# Patient Record
Sex: MALE | Race: WHITE | Employment: UNEMPLOYED | URBAN - METROPOLITAN AREA
[De-identification: names, ages, dates, MRNs, and addresses within clinical notes are randomized per-mention and may not be internally consistent; named-entity substitution may affect disease eponyms.]

---

## 2018-05-16 ENCOUNTER — APPOINTMENT (OUTPATIENT)
Dept: CT IMAGING | Age: 33
End: 2018-05-16
Attending: PHYSICIAN ASSISTANT
Payer: SELF-PAY

## 2018-05-16 ENCOUNTER — HOSPITAL ENCOUNTER (EMERGENCY)
Age: 33
Discharge: HOME | End: 2018-05-16
Attending: EMERGENCY MEDICINE
Payer: SELF-PAY

## 2018-05-16 VITALS
HEART RATE: 99 BPM | BODY MASS INDEX: 22.53 KG/M2 | TEMPERATURE: 97.1 F | SYSTOLIC BLOOD PRESSURE: 133 MMHG | DIASTOLIC BLOOD PRESSURE: 85 MMHG | OXYGEN SATURATION: 99 % | WEIGHT: 185 LBS | HEIGHT: 76 IN | RESPIRATION RATE: 16 BRPM

## 2018-05-16 DIAGNOSIS — S01.311A LACERATION OF RIGHT EAR LOBE, INITIAL ENCOUNTER: ICD-10-CM

## 2018-05-16 DIAGNOSIS — S01.81XA FACIAL LACERATION, INITIAL ENCOUNTER: Primary | ICD-10-CM

## 2018-05-16 PROCEDURE — 75810000293 HC SIMP/SUPERF WND  RPR

## 2018-05-16 PROCEDURE — 99282 EMERGENCY DEPT VISIT SF MDM: CPT

## 2018-05-16 PROCEDURE — 90715 TDAP VACCINE 7 YRS/> IM: CPT | Performed by: PHYSICIAN ASSISTANT

## 2018-05-16 PROCEDURE — 74011250636 HC RX REV CODE- 250/636: Performed by: PHYSICIAN ASSISTANT

## 2018-05-16 PROCEDURE — 77030031132 HC SUT NYL COVD -A

## 2018-05-16 PROCEDURE — 70450 CT HEAD/BRAIN W/O DYE: CPT

## 2018-05-16 PROCEDURE — 77030018836 HC SOL IRR NACL ICUM -A

## 2018-05-16 PROCEDURE — 70486 CT MAXILLOFACIAL W/O DYE: CPT

## 2018-05-16 PROCEDURE — 90471 IMMUNIZATION ADMIN: CPT

## 2018-05-16 PROCEDURE — 77030002986 HC SUT PROL J&J -A

## 2018-05-16 RX ORDER — LIDOCAINE HYDROCHLORIDE 10 MG/ML
INJECTION, SOLUTION EPIDURAL; INFILTRATION; INTRACAUDAL; PERINEURAL
Status: DISCONTINUED
Start: 2018-05-16 | End: 2018-05-16 | Stop reason: HOSPADM

## 2018-05-16 RX ORDER — CEPHALEXIN 500 MG/1
500 CAPSULE ORAL 3 TIMES DAILY
Qty: 30 CAP | Refills: 0 | Status: SHIPPED | OUTPATIENT
Start: 2018-05-16 | End: 2018-05-26

## 2018-05-16 RX ORDER — OXYCODONE AND ACETAMINOPHEN 5; 325 MG/1; MG/1
1 TABLET ORAL
Qty: 6 TAB | Refills: 0 | Status: SHIPPED | OUTPATIENT
Start: 2018-05-16 | End: 2019-05-01 | Stop reason: CLARIF

## 2018-05-16 RX ADMIN — TETANUS TOXOID, REDUCED DIPHTHERIA TOXOID AND ACELLULAR PERTUSSIS VACCINE, ADSORBED 0.5 ML: 5; 2.5; 8; 8; 2.5 SUSPENSION INTRAMUSCULAR at 17:08
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
## 2018-05-16 NOTE — DISCHARGE INSTRUCTIONS
Cuts Closed With Stitches: Care Instructions  Your Care Instructions  A cut can happen anywhere on your body. The doctor used stitches to close the cut. Using stitches also helps the cut heal and reduces scarring. Sometimes pieces of tape called Steri-Strips are put over the stitches. If the cut went deep and through the skin, the doctor may have put in two layers of stitches. The deeper layer brings the deep part of the cut together. These stitches will dissolve and don't need to be removed. The stitches in the upper layer are the ones you see on the cut. You will probably have a bandage over the stitches. You will need to have the stitches removed, usually in 7 to 14 days. The doctor has checked you carefully, but problems can develop later. If you notice any problems or new symptoms, get medical treatment right away. Follow-up care is a key part of your treatment and safety. Be sure to make and go to all appointments, and call your doctor if you are having problems. It's also a good idea to know your test results and keep a list of the medicines you take. How can you care for yourself at home? · Keep the cut dry for the first 24 to 48 hours. After this, you can shower if your doctor okays it. Pat the cut dry. · Don't soak the cut, such as in a bathtub. Your doctor will tell you when it's safe to get the cut wet. · If your doctor told you how to care for your cut, follow your doctor's instructions. If you did not get instructions, follow this general advice:  ¨ After the first 24 to 48 hours, wash around the cut with clean water 2 times a day. Don't use hydrogen peroxide or alcohol, which can slow healing. ¨ You may cover the cut with a thin layer of petroleum jelly, such as Vaseline, and a nonstick bandage. ¨ Apply more petroleum jelly and replace the bandage as needed. · Prop up the sore area on a pillow anytime you sit or lie down during the next 3 days.  Try to keep it above the level of your heart. This will help reduce swelling. · Avoid any activity that could cause your cut to reopen. · Do not remove the stitches on your own. Your doctor will tell you when to come back to have the stitches removed. · Leave Steri-Strips on until they fall off. · Be safe with medicines. Read and follow all instructions on the label. ¨ If the doctor gave you a prescription medicine for pain, take it as prescribed. ¨ If you are not taking a prescription pain medicine, ask your doctor if you can take an over-the-counter medicine. When should you call for help? Call your doctor now or seek immediate medical care if:  ? · You have new pain, or your pain gets worse. ? · The skin near the cut is cold or pale or changes color. ? · You have tingling, weakness, or numbness near the cut.   ? · The cut starts to bleed, and blood soaks through the bandage. Oozing small amounts of blood is normal.   ? · You have trouble moving the area near the cut.   ? · You have symptoms of infection, such as:  ¨ Increased pain, swelling, warmth, or redness around the cut. ¨ Red streaks leading from the cut. ¨ Pus draining from the cut. ¨ A fever. ? Watch closely for changes in your health, and be sure to contact your doctor if:  ? · The cut reopens. ? · You do not get better as expected. Where can you learn more? Go to http://jen-roscoe.info/. Enter R217 in the search box to learn more about \"Cuts Closed With Stitches: Care Instructions. \"  Current as of: March 20, 2017  Content Version: 11.4  © 7504-9381 [employer]. Care instructions adapted under license by [employer] (which disclaims liability or warranty for this information). If you have questions about a medical condition or this instruction, always ask your healthcare professional. [address] any warranty or liability for your use of this information.

## 2018-06-13 NOTE — COMMUNITY CARE MANAGEMENT
Called patient at number listed [phone] regarding services offered. Phone is no longer in service. Called emergency contact they stated they could not locate patient.   Will mail information regarding service

## 2019-05-01 ENCOUNTER — HOSPITAL ENCOUNTER (EMERGENCY)
Age: 34
Discharge: HOME | End: 2019-05-01
Attending: EMERGENCY MEDICINE
Payer: COMMERCIAL

## 2019-05-01 VITALS
BODY MASS INDEX: 21.92 KG/M2 | WEIGHT: 180 LBS | RESPIRATION RATE: 16 BRPM | HEIGHT: 76 IN | DIASTOLIC BLOOD PRESSURE: 70 MMHG | OXYGEN SATURATION: 94 % | HEART RATE: 96 BPM | SYSTOLIC BLOOD PRESSURE: 114 MMHG | TEMPERATURE: 97.8 F

## 2019-05-01 DIAGNOSIS — S01.81XA FACIAL LACERATION, INITIAL ENCOUNTER: Primary | ICD-10-CM

## 2019-05-01 PROCEDURE — 75810000293 HC SIMP/SUPERF WND  RPR

## 2019-05-01 PROCEDURE — 99282 EMERGENCY DEPT VISIT SF MDM: CPT

## 2019-05-01 PROCEDURE — 77030008460 HC STPLR SKN PRECIS 3M -A
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
## 2019-05-01 NOTE — DISCHARGE INSTRUCTIONS

## 2019-05-01 NOTE — ED NOTES
Discharged in the care of [facility]. Patient ambulatory from the ed in no distress
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
## 2021-06-18 ENCOUNTER — HOSPITAL ENCOUNTER (EMERGENCY)
Age: 36
Discharge: STILL ADMITTED OR ADMINISTRATIVE | End: 2021-06-18
Attending: EMERGENCY MEDICINE

## 2021-06-18 NOTE — ED PROVIDER NOTES
EMERGENCY DEPARTMENT HISTORY AND PHYSICAL EXAM  This was created with voice recognition software and transcription errors may be present. 12:08 PM  Date: (Not on file)  Patient Name: Cherry Perez    History of Presenting Illness     Chief Complaint:    History Provided By:     HPI: Cherry Perez is a 28 y.o. male presents in the lobe immediately. Patient was never seen he literally walked from the back door through the front door and out the ER    PCP: None      Past History     Past Medical History:  Past Medical History:   Diagnosis Date    Heroin addiction ([facility] [address]        Past Surgical History:  Past Surgical History:   Procedure Laterality Date    HX HERNIA REPAIR         Family History:  Family History   Family history unknown: Yes       Social History:  Social History     Tobacco Use    Smoking status: Current Every Day Smoker    Smokeless tobacco: Never Used   Substance Use Topics    Alcohol use: Yes     Alcohol/week: 10.0 standard drinks     Types: 10 Cans of beer per week     Comment: DAILY ALCOHOL INTAKE    Drug use: Yes     Frequency: 2.0 times per week     Types: Heroin       Allergies:  No Known Allergies    Review of Systems     Review of Systems  10 point review of systems otherwise negative unless noted in HPI. Physical Exam       Physical Exam    Diagnostic Study Results     Vital Signs  EKG:  Labs:   Imaging:     Medical Decision Making     ED Course: Progress Notes, Reevaluation, and Consults:    I will be the provider of record for this patient. Provider Notes (Medical Decision Making):          Diagnosis     Clinical Impression: No diagnosis found.     Disposition:        Patient's Medications    No medications on file

## 2021-07-15 ENCOUNTER — HOSPITAL ENCOUNTER (EMERGENCY)
Age: 36
Discharge: LEFT BEFORE DISCHARGE | End: 2021-07-15
Attending: STUDENT IN AN ORGANIZED HEALTH CARE EDUCATION/TRAINING PROGRAM | Admitting: STUDENT IN AN ORGANIZED HEALTH CARE EDUCATION/TRAINING PROGRAM

## 2021-07-15 VITALS
BODY MASS INDEX: 21.92 KG/M2 | SYSTOLIC BLOOD PRESSURE: 118 MMHG | HEIGHT: 76 IN | TEMPERATURE: 98.1 F | RESPIRATION RATE: 18 BRPM | WEIGHT: 180 LBS | HEART RATE: 103 BPM | DIASTOLIC BLOOD PRESSURE: 61 MMHG | OXYGEN SATURATION: 99 %

## 2021-07-15 DIAGNOSIS — T40.1X1A ACCIDENTAL OVERDOSE OF HEROIN, INITIAL ENCOUNTER (HCC): Primary | ICD-10-CM

## 2021-07-15 LAB — GLUCOSE BLD STRIP.AUTO-MCNC: 114 MG/DL (ref 70–110)

## 2021-07-15 PROCEDURE — 96360 HYDRATION IV INFUSION INIT: CPT

## 2021-07-15 PROCEDURE — 99284 EMERGENCY DEPT VISIT MOD MDM: CPT

## 2021-07-15 PROCEDURE — 74011250636 HC RX REV CODE- 250/636: Performed by: STUDENT IN AN ORGANIZED HEALTH CARE EDUCATION/TRAINING PROGRAM

## 2021-07-15 PROCEDURE — 82962 GLUCOSE BLOOD TEST: CPT

## 2021-07-15 RX ORDER — NALOXONE HYDROCHLORIDE 4 MG/.1ML
SPRAY NASAL
Qty: 2 EACH | Refills: 0 | Status: SHIPPED | OUTPATIENT
Start: 2021-07-15 | End: 2021-12-23 | Stop reason: SDUPTHER

## 2021-07-15 RX ADMIN — SODIUM CHLORIDE 1000 ML: 900 INJECTION, SOLUTION INTRAVENOUS at 19:24

## 2021-07-15 NOTE — ED TRIAGE NOTES
Patient arrived via EMS presenting with heroine overdose. Patient was given 2 Narcan via nasal and 1 Narcan in IV. Blood glucose was 175.

## 2021-07-15 NOTE — ED PROVIDER NOTES
EMERGENCY DEPARTMENT HISTORY AND PHYSICAL EXAM    7:21 PM      Date: 7/15/2021  Patient Name: Sylwia Elliott    History of Presenting Illness     Chief Complaint   Patient presents with    Drug Overdose         History Provided By: Patient, EMS     Additional History (Context): Sylwia Elliott is a 39 y.o. male with hx of heroin addiction who presents to the ED due to heroin overdose. EMS note patient was found apneic, unresponsive, 2 mg IN and 1 mg IV Narcan administered, pt became responsive, alert. Pt denies SI, HI, visual or auditory hallucinations. Admits to heroin use PTA. Denies headache, dizziness, chest pain, shortness of breath, abdominal pain, nausea or vomiting. Patient denies any concerns at this time, is requesting to leave. PCP: None    Past History     Past Medical History:  Past Medical History:   Diagnosis Date    Heroin addiction ([facility] [address]        Past Surgical History:  Past Surgical History:   Procedure Laterality Date    HX HERNIA REPAIR         Family History:  Family History   Family history unknown: Yes       Social History:  Social History     Tobacco Use    Smoking status: Current Every Day Smoker    Smokeless tobacco: Never Used   Substance Use Topics    Alcohol use: Yes     Alcohol/week: 10.0 standard drinks     Types: 10 Cans of beer per week     Comment: DAILY ALCOHOL INTAKE    Drug use: Yes     Frequency: 2.0 times per week     Types: Heroin       Allergies:  No Known Allergies      Review of Systems       Review of Systems   Constitutional: Negative for chills and fever. Respiratory: Negative for shortness of breath. Cardiovascular: Negative for chest pain. Gastrointestinal: Negative for abdominal pain, nausea and vomiting. Skin: Negative for rash. Neurological: Negative for weakness. All other systems reviewed and are negative.         Physical Exam     Visit Vitals  /61 (BP 1 Location: Right upper arm, BP Patient Position: Sitting)   Pulse (!) 103   Temp 98.1 °F (36.7 °C)   Resp 18   Ht 6' 4\" (1.93 m)   Wt 81.6 kg (180 lb)   SpO2 99%   BMI 21.91 kg/m²         Physical Exam  Vitals and nursing note reviewed. Constitutional:       General: He is not in acute distress. Appearance: Normal appearance. He is well-developed. He is not ill-appearing, toxic-appearing or diaphoretic. HENT:      Head: Normocephalic and atraumatic. Cardiovascular:      Rate and Rhythm: Normal rate and regular rhythm. Heart sounds: Normal heart sounds. No murmur heard. No friction rub. No gallop. Pulmonary:      Effort: Pulmonary effort is normal. No respiratory distress. Breath sounds: Normal breath sounds. No wheezing or rales. Abdominal:      General: Abdomen is flat. There is no distension. Tenderness: There is no abdominal tenderness. Musculoskeletal:         General: Normal range of motion. Cervical back: Normal range of motion and neck supple. Skin:     General: Skin is warm. Findings: No rash. Neurological:      General: No focal deficit present. Mental Status: He is alert and oriented to person, place, and time. Cranial Nerves: No cranial nerve deficit. Sensory: No sensory deficit. Motor: No weakness. Gait: Gait normal.           Diagnostic Study Results     Labs -  Recent Results (from the past 12 hour(s))   GLUCOSE, POC    Collection Time: 07/15/21  7:51 PM   Result Value Ref Range    Glucose (POC) 114 (H) 70 - 110 mg/dL       Radiologic Studies -   No orders to display         Medical Decision Making   I am the first provider for this patient. I reviewed the vital signs, available nursing notes, past medical history, past surgical history, family history and social history. Vital Signs-Reviewed the patient's vital signs.     Records Reviewed: Nursing Notes and Old Medical Records (Time of Review: 7:21 PM)    ED Course: Progress Notes, Reevaluation, and Consults:  8:30 PM: Pt took out his IV, ambulated out of the ED per nurse. Provider Notes (Medical Decision Making): 38 yo M who presents to the ED after heroin overdose. Narcan administered via EMS. Pt denies any concerns or complaints. Denies SI, HI. Observed in the ED x 1.5 hours, eloped prior to reassessment. Diagnosis     Clinical Impression:   1. Accidental overdose of heroin, initial encounter [facility])        Disposition: eloped     Follow-up Information     Follow up With Specialties Details Why [address] [facility]    [facility] EMERGENCY DEPT Emergency Medicine  If symptoms worsen [address] [facility] [address]  [phone]           Patient's Medications   Start Taking    NALOXONE [facility]) 4 MG/ACTUATION NASAL SPRAY    Use 1 spray intranasally, then discard. Repeat with new spray every 2 min as needed for opioid overdose symptoms, alternating nostrils. Continue Taking    No medications on file   These Medications have changed    No medications on file   Stop Taking    No medications on file       Dictation disclaimer:  Please note that this dictation was completed with Yap, the computer voice recognition software. Quite often unanticipated grammatical, syntax, homophones, and other interpretive errors are inadvertently transcribed by the computer software. Please disregard these errors. Please excuse any errors that have escaped final proofreading.

## 2021-07-15 NOTE — DISCHARGE INSTRUCTIONS
Take medication as prescribed. Follow-up with your primary care physician within 2 days for reassessment. Bring the results from this visit with you for their review.  Return to the ED immediately for any new, worsening, or persistent symptoms, including

## 2021-07-18 ENCOUNTER — HOSPITAL ENCOUNTER (EMERGENCY)
Age: 36
Discharge: LEFT BEFORE DISCHARGE | End: 2021-07-18
Attending: EMERGENCY MEDICINE

## 2021-07-18 VITALS
RESPIRATION RATE: 18 BRPM | SYSTOLIC BLOOD PRESSURE: 128 MMHG | HEART RATE: 95 BPM | OXYGEN SATURATION: 96 % | TEMPERATURE: 97.1 F | WEIGHT: 175 LBS | HEIGHT: 76 IN | DIASTOLIC BLOOD PRESSURE: 63 MMHG | BODY MASS INDEX: 21.31 KG/M2
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
## 2021-07-18 DIAGNOSIS — R45.851 SUICIDAL IDEATION: Primary | ICD-10-CM

## 2021-07-18 DIAGNOSIS — E87.0 HYPERNATREMIA: ICD-10-CM

## 2021-07-18 LAB
ALBUMIN SERPL-MCNC: 3.4 G/DL (ref 3.4–5)
ALBUMIN/GLOB SERPL: 0.9 {RATIO} (ref 0.8–1.7)
ALP SERPL-CCNC: 88 U/L (ref 45–117)
ALT SERPL-CCNC: 42 U/L (ref 16–61)
ANION GAP SERPL CALC-SCNC: 6 MMOL/L (ref 3–18)
AST SERPL-CCNC: 26 U/L (ref 10–38)
BASOPHILS # BLD: 0 K/UL (ref 0–0.1)
BASOPHILS NFR BLD: 1 % (ref 0–2)
BILIRUB SERPL-MCNC: 0.7 MG/DL (ref 0.2–1)
BUN SERPL-MCNC: 7 MG/DL (ref 7–18)
BUN/CREAT SERPL: 8 (ref 12–20)
CALCIUM SERPL-MCNC: 8 MG/DL (ref 8.5–10.1)
CHLORIDE SERPL-SCNC: 116 MMOL/L (ref 100–111)
CO2 SERPL-SCNC: 27 MMOL/L (ref 21–32)
CREAT SERPL-MCNC: 0.89 MG/DL (ref 0.6–1.3)
DIFFERENTIAL METHOD BLD: NORMAL
EOSINOPHIL # BLD: 0.2 K/UL (ref 0–0.4)
EOSINOPHIL NFR BLD: 3 % (ref 0–5)
ERYTHROCYTE [DISTWIDTH] IN BLOOD BY AUTOMATED COUNT: 11.7 % (ref 11.6–14.5)
ETHANOL SERPL-MCNC: 239 MG/DL (ref 0–3)
GLOBULIN SER CALC-MCNC: 3.6 G/DL (ref 2–4)
GLUCOSE SERPL-MCNC: 107 MG/DL (ref 74–99)
HCT VFR BLD AUTO: 42.5 % (ref 36–48)
HGB BLD-MCNC: 14.7 G/DL (ref 13–16)
LYMPHOCYTES # BLD: 2.4 K/UL (ref 0.9–3.6)
LYMPHOCYTES NFR BLD: 33 % (ref 21–52)
MAGNESIUM SERPL-MCNC: 2.1 MG/DL (ref 1.6–2.6)
MCH RBC QN AUTO: 31.1 PG (ref 24–34)
MCHC RBC AUTO-ENTMCNC: 34.6 G/DL (ref 31–37)
MCV RBC AUTO: 89.9 FL (ref 74–97)
MONOCYTES # BLD: 0.7 K/UL (ref 0.05–1.2)
MONOCYTES NFR BLD: 10 % (ref 3–10)
NEUTS SEG # BLD: 4 K/UL (ref 1.8–8)
NEUTS SEG NFR BLD: 54 % (ref 40–73)
PLATELET # BLD AUTO: 319 K/UL (ref 135–420)
PMV BLD AUTO: 9.3 FL (ref 9.2–11.8)
POTASSIUM SERPL-SCNC: 3.4 MMOL/L (ref 3.5–5.5)
PROT SERPL-MCNC: 7 G/DL (ref 6.4–8.2)
RBC # BLD AUTO: 4.73 M/UL (ref 4.35–5.65)
SODIUM SERPL-SCNC: 149 MMOL/L (ref 136–145)
WBC # BLD AUTO: 7.4 K/UL (ref 4.6–13.2)

## 2021-07-18 PROCEDURE — 82077 ASSAY SPEC XCP UR&BREATH IA: CPT

## 2021-07-18 PROCEDURE — 74011250637 HC RX REV CODE- 250/637: Performed by: STUDENT IN AN ORGANIZED HEALTH CARE EDUCATION/TRAINING PROGRAM

## 2021-07-18 PROCEDURE — 80053 COMPREHEN METABOLIC PANEL: CPT

## 2021-07-18 PROCEDURE — 74011250636 HC RX REV CODE- 250/636: Performed by: STUDENT IN AN ORGANIZED HEALTH CARE EDUCATION/TRAINING PROGRAM

## 2021-07-18 PROCEDURE — 99283 EMERGENCY DEPT VISIT LOW MDM: CPT

## 2021-07-18 PROCEDURE — 83735 ASSAY OF MAGNESIUM: CPT

## 2021-07-18 PROCEDURE — 96360 HYDRATION IV INFUSION INIT: CPT

## 2021-07-18 PROCEDURE — 96361 HYDRATE IV INFUSION ADD-ON: CPT

## 2021-07-18 PROCEDURE — 85025 COMPLETE CBC W/AUTO DIFF WBC: CPT

## 2021-07-18 RX ORDER — IBUPROFEN 200 MG
1 TABLET ORAL
Status: DISCONTINUED | OUTPATIENT
Start: 2021-07-18 | End: 2021-07-18 | Stop reason: HOSPADM

## 2021-07-18 RX ORDER — IBUPROFEN 200 MG
1 TABLET ORAL DAILY
Status: DISCONTINUED | OUTPATIENT
Start: 2021-07-19 | End: 2021-07-18

## 2021-07-18 RX ADMIN — SODIUM CHLORIDE 1000 ML: 900 INJECTION, SOLUTION INTRAVENOUS at 15:00

## 2021-07-18 NOTE — ED NOTES
Per patient request, called father who is listed as emergency contact. Informed him of patients whereabouts and what is going on with him.

## 2021-07-18 NOTE — ED NOTES
[facility]. He told Jean-Paul Whitehead that we were going to call the police anyway. So he left with IV in place.

## 2021-07-18 NOTE — ED PROVIDER NOTES
EMERGENCY DEPARTMENT HISTORY AND PHYSICAL EXAM    1:37 PM      Date: 7/18/2021  Patient Name: Rodrigue Hilario    History of Presenting Illness     Chief Complaint   Patient presents with    Alcohol intoxication         History Provided By: Patient and EMS  Location/Duration/Severity/Modifying factors   The history is provided by the patient, the EMS personnel and medical records. Alcohol intoxication  Primary symptoms include: agitation, self-injury and intoxication. There areno confusion present at this time. This is a recurrent problem. The current episode started more than 2 days ago. The problem has not changed since onset. Suspected agents include alcohol and heroin. Pertinent negatives include no fever, no nausea and no vomiting. Associated medical issues include suicidal ideas and depression. PCP: None    Current Facility-Administered Medications   Medication Dose Route Frequency Provider Last Rate Last Admin    lactated ringers bolus infusion 1,000 mL  1,000 mL IntraVENous ONCE Belle BO MD        nicotine (NICODERM CQ) 14 mg/24 hr patch 1 Patch  1 Patch TransDERmal NOW Chris Stoddard MD   1 Patch at 07/18/21 4268     Current Outpatient Medications   Medication Sig Dispense Refill    naloxone (Narcan) 4 mg/actuation nasal spray Use 1 spray intranasally, then discard. Repeat with new spray every 2 min as needed for opioid overdose symptoms, alternating nostrils. 2 Each 0       Past History     Past Medical History:  Past Medical History:   Diagnosis Date    Heroin addiction ([address]        Past Surgical History:  Past Surgical History:   Procedure Laterality Date    HX HERNIA REPAIR         Family History:  Family History   Family history unknown: Yes       Social History:  Social History     Tobacco Use    Smoking status: Current Every Day Smoker    Smokeless tobacco: Never Used   Substance Use Topics    Alcohol use:  Yes     Alcohol/week: 10.0 standard drinks     Types: 10 Cans of beer per week     Comment: DAILY ALCOHOL INTAKE    Drug use: Yes     Frequency: 2.0 times per week     Types: Heroin       Allergies:  No Known Allergies      Review of Systems       Review of Systems   Constitutional: Negative for chills, diaphoresis and fever. HENT: Negative for sore throat, trouble swallowing and voice change. Eyes: Negative for photophobia and visual disturbance. Respiratory: Negative for cough and shortness of breath. Cardiovascular: Negative for chest pain and palpitations. Gastrointestinal: Negative for abdominal pain, blood in stool, diarrhea, nausea and vomiting. Genitourinary: Negative for dysuria, frequency and hematuria. Musculoskeletal: Negative for joint swelling and myalgias. Skin: Negative for color change and rash. Neurological: Negative for dizziness and syncope. Hematological: Negative for adenopathy. Does not bruise/bleed easily. Psychiatric/Behavioral: Positive for agitation, depression, self-injury and suicidal ideas. Negative for confusion and decreased concentration. Physical Exam     Visit Vitals  /63 (BP 1 Location: Right upper arm, BP Patient Position: At rest;Supine)   Pulse 95   Temp 97.1 °F (36.2 °C)   Resp 18   Ht 6' 4\" (1.93 m)   Wt 79.4 kg (175 lb)   SpO2 96%   BMI 21.30 kg/m²         Physical Exam  Vitals and nursing note reviewed. Constitutional:       General: He is not in acute distress. Appearance: He is well-developed. He is not toxic-appearing. HENT:      Head: Normocephalic and atraumatic. Mouth/Throat:      Mouth: Mucous membranes are dry. Pharynx: Oropharynx is clear. Eyes:      General: No scleral icterus. Extraocular Movements: Extraocular movements intact. Right eye: Normal extraocular motion and no nystagmus. Left eye: Normal extraocular motion and no nystagmus. Pupils: Pupils are equal, round, and reactive to light. Neck:      Meningeal: Kernig's sign absent. Cardiovascular:      Rate and Rhythm: Normal rate and regular rhythm. Pulses: Normal pulses. Pulmonary:      Effort: Pulmonary effort is normal.   Musculoskeletal:         General: No swelling or tenderness. Cervical back: Normal range of motion. No rigidity. Skin:     General: Skin is warm and dry. Coloration: Skin is not cyanotic or pale. Findings: No rash. Neurological:      Mental Status: He is alert. Cranial Nerves: No cranial nerve deficit, dysarthria or facial asymmetry. Sensory: No sensory deficit. Motor: No weakness. Psychiatric:         Attention and Perception: Attention and perception normal.         Mood and Affect: Mood is anxious and depressed. Affect is labile. Speech: Speech is slurred. Behavior: Behavior is agitated and withdrawn. Thought Content: Thought content includes suicidal ideation. Thought content includes suicidal plan. Cognition and Memory: Memory normal.         Judgment: Judgment is inappropriate. Diagnostic Study Results     Labs -  Recent Results (from the past 12 hour(s))   CBC WITH AUTOMATED DIFF    Collection Time: 07/18/21  3:37 PM   Result Value Ref Range    WBC 7.4 4.6 - 13.2 K/uL    RBC 4.73 4.35 - 5.65 M/uL    HGB 14.7 13.0 - 16.0 g/dL    HCT 42.5 36.0 - 48.0 %    MCV 89.9 74.0 - 97.0 FL    MCH 31.1 24.0 - 34.0 PG    MCHC 34.6 31.0 - 37.0 g/dL    RDW 11.7 11.6 - 14.5 %    PLATELET 998 094 - 874 K/uL    MPV 9.3 9.2 - 11.8 FL    NEUTROPHILS 54 40 - 73 %    LYMPHOCYTES 33 21 - 52 %    MONOCYTES 10 3 - 10 %    EOSINOPHILS 3 0 - 5 %    BASOPHILS 1 0 - 2 %    ABS. NEUTROPHILS 4.0 1.8 - 8.0 K/UL    ABS. LYMPHOCYTES 2.4 0.9 - 3.6 K/UL    ABS. MONOCYTES 0.7 0.05 - 1.2 K/UL    ABS. EOSINOPHILS 0.2 0.0 - 0.4 K/UL    ABS.  BASOPHILS 0.0 0.0 - 0.1 K/UL    DF AUTOMATED     METABOLIC PANEL, COMPREHENSIVE    Collection Time: 07/18/21  3:37 PM   Result Value Ref Range    Sodium 149 (H) 136 - 145 mmol/L Potassium 3.4 (L) 3.5 - 5.5 mmol/L    Chloride 116 (H) 100 - 111 mmol/L    CO2 27 21 - 32 mmol/L    Anion gap 6 3.0 - 18 mmol/L    Glucose 107 (H) 74 - 99 mg/dL    BUN 7 7.0 - 18 MG/DL    Creatinine 0.89 0.6 - 1.3 MG/DL    BUN/Creatinine ratio 8 (L) 12 - 20      GFR est AA >60 >60 ml/min/1.73m2    GFR est non-AA >60 >60 ml/min/1.73m2    Calcium 8.0 (L) 8.5 - 10.1 MG/DL    Bilirubin, total 0.7 0.2 - 1.0 MG/DL    ALT (SGPT) 42 16 - 61 U/L    AST (SGOT) 26 10 - 38 U/L    Alk. phosphatase 88 45 - 117 U/L    Protein, total 7.0 6.4 - 8.2 g/dL    Albumin 3.4 3.4 - 5.0 g/dL    Globulin 3.6 2.0 - 4.0 g/dL    A-G Ratio 0.9 0.8 - 1.7     MAGNESIUM    Collection Time: 07/18/21  3:37 PM   Result Value Ref Range    Magnesium 2.1 1.6 - 2.6 mg/dL   ETHYL ALCOHOL    Collection Time: 07/18/21  3:37 PM   Result Value Ref Range    ALCOHOL(ETHYL),SERUM 239 (H) 0 - 3 MG/DL       Radiologic Studies -   No orders to display         Medical Decision Making   I am the first provider for this patient. I reviewed the vital signs, available nursing notes, past medical history, past surgical history, family history and social history. Vital Signs-Reviewed the patient's vital signs. EKG: N/A    Records Reviewed: Nursing Notes, Old Medical Records, Previous Radiology Studies and Previous Laboratory Studies (Time of Review: 1:37 PM)    ED Course: Progress Notes, Reevaluation, and Consults:    ED Course as of Jul 18 1837   Sun Jul 18, 2021   157 61-year-old male history of tobacco abuse, several heroin overdoses, and alcohol abuse brought in by EMS for reported severe intoxication and being outside for several days per bystanders were on scene. Patient was recently seen at this ER a few days earlier for accidental heroin overdose but eloped from the department prior to completing his evaluation.   On my exam patient tearful and minimally cooperative with exam but is alert and oriented person place and time with no focal neurological deficits on exam.  Heavy smell of alcohol as well as patient appears clinically intoxicated on my exam.  Patient endorsed to me during this time that his recent evaluations for accidental heroin overdose were actually suicide attempts and that he had been suicidal for quite some time now. Initially, patient does consent for crisis evaluation once he is clinically sober. He is hemodynamically stable and physical exam without evidence of an organic medical cause for his symptoms. Initial differential considered to include but not limited to hypo/hyperthyroidism, porphyria, encephalitis, hypo/hyperglycemia, metabolic encephalopathy, parathyroid disease, acute intoxication, toxidrome, acute psychosis, and tasha. Laboratory evaluation started to exclude infectious etiology, hypo/hyperglycemia, metabolic encephalopathy, or parathyroid disease. There is no evidence to suggest acute psychosis or tasha at this time. Will obtain UDS and screening coronavirus testing as per psych admission protocol. [CM]   9954 Patient eloped from the emergency department with IVs still attached. Given patient with active suicidality with plan and multiple recent attempts patient does not have medical decision making to be able to refuse care and as such will call police in order to find the patient and plan for hopeful medical ECO. [CM]      ED Course User Index  [CM] Mirta Nieves MD       Provider Notes (Medical Decision Making): MDM    Procedures    Critical Care Time: N/A      Diagnosis     Clinical Impression:   1. Suicidal ideation    2. Hypernatremia    3. Blood alcohol level of 200-239 mg/100 ml        Disposition: Eloped    Follow-up Information    None          Discharge Medication List as of 7/18/2021  6:09 PM      CONTINUE these medications which have NOT CHANGED    Details   naloxone (Narcan) 4 mg/actuation nasal spray Use 1 spray intranasally, then discard.  Repeat with new spray every 2 min as needed for opioid overdose symptoms, alternating nostrils. , Normal, Disp-2 Each, R-0           Disclaimer: Sections of this note are dictated using utilizing voice recognition software. Minor typographical errors may be present. If questions arise, please do not hesitate to contact me or call our department.

## 2021-07-18 NOTE — ED TRIAGE NOTES
Per EMS, Patient is intoxicated. Someone found him behind the [place], He has been outside for a few days, admits to copious amounts of ETOH.

## 2021-10-02 ENCOUNTER — HOSPITAL ENCOUNTER (EMERGENCY)
Age: 36
Discharge: LEFT BEFORE DISCHARGE | End: 2021-10-02
Attending: STUDENT IN AN ORGANIZED HEALTH CARE EDUCATION/TRAINING PROGRAM

## 2021-10-02 VITALS
TEMPERATURE: 98 F | DIASTOLIC BLOOD PRESSURE: 74 MMHG | RESPIRATION RATE: 16 BRPM | OXYGEN SATURATION: 95 % | HEART RATE: 91 BPM | SYSTOLIC BLOOD PRESSURE: 118 MMHG

## 2021-10-02 DIAGNOSIS — F10.920 ALCOHOLIC INTOXICATION WITHOUT COMPLICATION (HCC): Primary | ICD-10-CM

## 2021-10-02 PROCEDURE — 99282 EMERGENCY DEPT VISIT SF MDM: CPT

## 2021-10-02 PROCEDURE — 99281 EMR DPT VST MAYX REQ PHY/QHP: CPT

## 2021-10-02 NOTE — ED TRIAGE NOTES
Pt arrived via medic with [place] police after overdosing on 2 caps of fentanyl.  Pt awake, alert, oriented and talkative on arrival.

## 2021-10-02 NOTE — ED PROVIDER NOTES
HPI   Patient is a 26-year-old male who presents after being brought in by police after he was found wandering around. Patient does admit to drug and alcohol use including heroin. He states that he snorts and does not inject. He denies any other drugs at this time. He has any physical pain or discomfort. His main complaint is that he would like water. He denies any chest pain, shortness breath, difficulty breathing, fevers, sweats, chills. He has no other physical bites at this time. Past Medical History:   Diagnosis Date    Heroin addiction [facility])        Past Surgical History:   Procedure Laterality Date    HX HERNIA REPAIR           Family History:   Family history unknown: Yes       Social History     Socioeconomic History    Marital status: SINGLE     Spouse name: Not on file    Number of children: Not on file    Years of education: Not on file    Highest education level: Not on file   Occupational History    Not on file   Tobacco Use    Smoking status: Current Every Day Smoker    Smokeless tobacco: Never Used   Substance and Sexual Activity    Alcohol use: Yes     Alcohol/week: 10.0 standard drinks     Types: 10 Cans of beer per week     Comment: DAILY ALCOHOL INTAKE    Drug use: Yes     Frequency: 2.0 times per week     Types: Heroin    Sexual activity: Yes     Partners: Female     Birth control/protection: None   Other Topics Concern    Not on file   Social History Narrative    Not on file     Social Determinants of Health     Financial Resource Strain:     Difficulty of Paying Living Expenses:    Food Insecurity:     Worried About Running Out of Food in the Last Year:     Ran Out of Food in the Last Year:    Transportation Needs:     Lack of Transportation (Medical):      Lack of Transportation (Non-Medical):    Physical Activity:     Days of Exercise per Week:     Minutes of Exercise per Session:    Stress:     Feeling of Stress :    Social Connections:     Frequency of Communication with Friends and Family:     Frequency of Social Gatherings with Friends and Family:     Attends Orthodoxy Services:     Active Member of Clubs or Organizations:     Attends Club or Organization Meetings:     Marital Status:    Intimate Partner Violence:     Fear of Current or Ex-Partner:     Emotionally Abused:     Physically Abused:     Sexually Abused: ALLERGIES: Patient has no known allergies. Review of Systems  Constitutional: No fever  HENT: No ear pain  Eyes: No change in vision  Respiratory: No SOB  Cardio: No chest pain  GI: No blood in stool  : No hematuria  MSK: No back pain  Skin: No rashes  Neuro: No headache    Vitals:    10/02/21 0441   BP: 118/74   Pulse: 91   Resp: 16   Temp: 98 °F (36.7 °C)   SpO2: 95%            Physical Exam   General: No acute distress  Head: Normocephalic, atraumatic  Psych: Cooperative and alert  Eyes: Pupils 2 mm, equal round and reactive  ENT: Moist oral mucosa  Neck: Supple  CV: Regular rate and rhythm, no pitting edema, palpable radial pulses  Pulm: Clear breath sounds bilaterally without any wheezing or rhonchi, normal respiratory rate  GI: Normal bowel sounds, soft, non-tender  MSK: Moves all four extremities  Skin: No rashes  Neuro: Alert and conversive    MDM   Patient is a 68-year-old male who presents with acute intoxication. Patient is not showing any specific toxidromes. He has no concerning findings of trauma or injury and no physical complaints. His physical exam is otherwise unremarkable and he is hemodynamically stable. Patient will be monitored and clinically sober. I did want to obtain a CBG but patient refuses at this time. Is no history of diabetes he will be given some food and water. Patient was signed out to [facility] pending clinical sobriety. He continues to be hemodynamically stable.     Procedures

## 2021-10-02 NOTE — ED NOTES
[address] HANDOFF      Patient was turned over to me at the regular change of shift by Dr. Jyoti Roth, at 3429. Patient presented with evidently heroin overdose combined with alcohol and other substances. Plan for this patient is: Awaiting for sobriety for discharge planning. No results found for this or any previous visit (from the past 12 hour(s)). No orders to display       Medications - No data to display      Course:   ED Course as of Oct 02 1548   Sat Oct 02, 2021   0900 Patient ambulated to the bathroom advised the patient that I would be in to talk to him shortly to his hallway bed. He was at that time, ambulated with a steady gait and seemed to be speaking coherently. He eloped from the emergency department shortly thereafter. [BERNADINE]      ED Course User Index  [BERNADINE] Hayes Bettencourt DO       Diagnosis:   1. Alcoholic intoxication without complication ([facility] [address]          Disposition: Eloped    Follow-up Information       Follow up With Specialties Details Why [address] [place] [address]  In 1 week  [address] [facility]  [phone]            Patient's Medications   Start Taking    No medications on file   Continue Taking    NALOXONE (NARCAN) 4 MG/ACTUATION NASAL SPRAY    Use 1 spray intranasally, then discard. Repeat with new spray every 2 min as needed for opioid overdose symptoms, alternating nostrils.    These Medications have changed    No medications on file   Stop Taking    No medications on file

## 2021-12-09 ENCOUNTER — HOSPITAL ENCOUNTER (EMERGENCY)
Age: 36
Discharge: LEFT BEFORE DISCHARGE | End: 2021-12-09
Attending: EMERGENCY MEDICINE | Admitting: EMERGENCY MEDICINE

## 2021-12-09 VITALS
OXYGEN SATURATION: 96 % | TEMPERATURE: 97.9 F | HEART RATE: 90 BPM | DIASTOLIC BLOOD PRESSURE: 80 MMHG | RESPIRATION RATE: 16 BRPM | SYSTOLIC BLOOD PRESSURE: 139 MMHG

## 2021-12-09 DIAGNOSIS — F19.90 DRUG USE DISORDER: Primary | ICD-10-CM

## 2021-12-09 PROCEDURE — 99282 EMERGENCY DEPT VISIT SF MDM: CPT

## 2021-12-09 RX ORDER — DIAZEPAM 5 MG/1
10 TABLET ORAL
Status: DISCONTINUED | OUTPATIENT
Start: 2021-12-09 | End: 2021-12-09 | Stop reason: HOSPADM

## 2021-12-09 NOTE — ED TRIAGE NOTES
Patient BIBA for involuntary tremors. Patient was found on the side of the road, picked by CARMELINA Henderson.

## 2021-12-09 NOTE — ED PROVIDER NOTES
EMERGENCY DEPARTMENT HISTORY AND PHYSICAL EXAM    3:27 PM patient seen at this time on EMS stretcher in [facility] to expedite care      Date: 12/9/2021  Patient Name: Shanel Bush    History of Presenting Illness     Chief Complaint   Patient presents with    Other         History Provided By: Paramedic    Additional History (Context): Shanel Bush is a 39 y.o. male presents with passerby called 911 patient seated on the curb while gesticulations. Police paramedics contacted the patient he acknowledges using 1 cap of fentanyl, denies coingestants. Denies any further medical problems or complaints. Says he will not wear a mask, because he cannot breathe. History review of systems limited by patient's cooperation. PCP: None    Chief Complaint:   Duration:    Timing:    Location:   Quality:   Severity:   Modifying Factors:   Associated Symptoms:       Current Facility-Administered Medications   Medication Dose Route Frequency Provider Last Rate Last Admin    diazePAM (VALIUM) tablet 10 mg  10 mg Oral NOW Dick Storm MD         Current Outpatient Medications   Medication Sig Dispense Refill    naloxone (Narcan) 4 mg/actuation nasal spray Use 1 spray intranasally, then discard. Repeat with new spray every 2 min as needed for opioid overdose symptoms, alternating nostrils. 2 Each 0       Past History     Past Medical History:  Past Medical History:   Diagnosis Date    Heroin addiction ([facility] [address]        Past Surgical History:  Past Surgical History:   Procedure Laterality Date    HX HERNIA REPAIR         Family History:  Family History   Family history unknown: Yes       Social History:  Social History     Tobacco Use    Smoking status: Current Every Day Smoker    Smokeless tobacco: Never Used   Substance Use Topics    Alcohol use:  Yes     Alcohol/week: 10.0 standard drinks     Types: 10 Cans of beer per week     Comment: DAILY ALCOHOL INTAKE    Drug use: Yes     Frequency: 2.0 times per week Types: Heroin       Allergies:  No Known Allergies      Review of Systems     Review of Systems   Cardiovascular: Negative for chest pain. Gastrointestinal: Negative for abdominal pain, diarrhea and vomiting. Physical Exam       Patient Vitals for the past 12 hrs:   Temp Pulse Resp BP SpO2   12/09/21 1532 97.9 °F (36.6 °C) 90 16 139/80 96 %       IPVITALS  Patient Vitals for the past 24 hrs:   BP Temp Pulse Resp SpO2   12/09/21 1532 139/80 97.9 °F (36.6 °C) 90 16 96 %       Physical Exam  Vitals and nursing note reviewed. Constitutional:       General: He is not in acute distress. Appearance: He is well-developed. HENT:      Head: Normocephalic and atraumatic. Eyes:      General: No scleral icterus. Extraocular Movements: Extraocular movements intact. Conjunctiva/sclera: Conjunctivae normal.      Pupils: Pupils are equal, round, and reactive to light. Neck:      Vascular: No JVD. Cardiovascular:      Rate and Rhythm: Normal rate and regular rhythm. Pulmonary:      Effort: Pulmonary effort is normal. No respiratory distress. Musculoskeletal:         General: Normal range of motion. Cervical back: Normal range of motion and neck supple. Skin:     General: Skin is warm and dry. Neurological:      General: No focal deficit present. Mental Status: He is alert. Cranial Nerves: No cranial nerve deficit. Comments: Rapid flailing gesticulations. Suspect stimulant intoxication. Not seizures not organic myoclonus   Psychiatric:         Thought Content: Thought content normal.         Judgment: Judgment normal.           Diagnostic Study Results   Labs -  No results found for this or any previous visit (from the past 12 hour(s)). Radiologic Studies -   No orders to display     No results found.     Medications ordered:   Medications   diazePAM (VALIUM) tablet 10 mg (has no administration in time range)         Medical Decision Making   Initial Medical Decision Making and DDx:  Suspect stimulant intoxication clinical picture not consistent with stroke intracranial hemorrhage electrolyte abnormality muscle spasms myoclonus or seizure. Benzos ordered    ED Course: Progress Notes, Reevaluation, and Consults:     4:03 PM notified by security the patient was preparing to leave he is getting dressed problems armband off. His behavior is not aggressive though his gesticulations continue. He has not received his Valium. I did offer him his Valium as well as other comfort measures he declines says thank you puts on his clothing and Aleve and leaves the ER. He did not voice any suicidal or homicidal ideation there is no indication of psychosis, have no cause to hold him against as well. I am the first provider for this patient. I reviewed the vital signs, available nursing notes, past medical history, past surgical history, family history and social history. Patient Vitals for the past 12 hrs:   Temp Pulse Resp BP SpO2   12/09/21 1532 97.9 °F (36.6 °C) 90 16 139/80 96 %       Vital Signs-Reviewed the patient's vital signs. Pulse Oximetry Analysis, Cardiac Monitor, 12 lead ekg:      Interpreted by the EP. Records Reviewed: Nursing notes reviewed (Time of Review: 3:27 PM)    Procedures:   Critical Care Time:   Aspirin: (was aspirin given for stroke?)    Diagnosis     Clinical Impression:   1. Drug use disorder        Disposition: Eloped      Follow-up Information    None          Patient's Medications   Start Taking    No medications on file   Continue Taking    NALOXONE (NARCAN) 4 MG/ACTUATION NASAL SPRAY    Use 1 spray intranasally, then discard. Repeat with new spray every 2 min as needed for opioid overdose symptoms, alternating nostrils.    These Medications have changed    No medications on file   Stop Taking    No medications on file     _______________________________    Notes:    Vonzella Habermann, MD using Dragon dictation      _______________________________

## 2021-12-23 ENCOUNTER — HOSPITAL ENCOUNTER (EMERGENCY)
Age: 36
Discharge: HOME | End: 2021-12-24
Attending: STUDENT IN AN ORGANIZED HEALTH CARE EDUCATION/TRAINING PROGRAM

## 2021-12-23 ENCOUNTER — HOSPITAL ENCOUNTER (EMERGENCY)
Age: 36
Discharge: HOME | End: 2021-12-23
Attending: EMERGENCY MEDICINE

## 2021-12-23 VITALS
OXYGEN SATURATION: 96 % | DIASTOLIC BLOOD PRESSURE: 78 MMHG | TEMPERATURE: 98.4 F | SYSTOLIC BLOOD PRESSURE: 122 MMHG | RESPIRATION RATE: 22 BRPM | HEART RATE: 89 BPM

## 2021-12-23 VITALS
TEMPERATURE: 98.2 F | RESPIRATION RATE: 20 BRPM | HEIGHT: 73 IN | WEIGHT: 165 LBS | BODY MASS INDEX: 21.87 KG/M2 | DIASTOLIC BLOOD PRESSURE: 69 MMHG | SYSTOLIC BLOOD PRESSURE: 131 MMHG | OXYGEN SATURATION: 100 % | HEART RATE: 86 BPM

## 2021-12-23 DIAGNOSIS — F10.920 ALCOHOLIC INTOXICATION WITHOUT COMPLICATION (HCC): Primary | ICD-10-CM

## 2021-12-23 DIAGNOSIS — F11.10 HEROIN ABUSE (HCC): Primary | ICD-10-CM

## 2021-12-23 PROCEDURE — 99283 EMERGENCY DEPT VISIT LOW MDM: CPT

## 2021-12-23 PROCEDURE — 99281 EMR DPT VST MAYX REQ PHY/QHP: CPT

## 2021-12-23 RX ORDER — NALOXONE HYDROCHLORIDE 4 MG/.1ML
SPRAY NASAL
Qty: 2 EACH | Refills: 0 | Status: SHIPPED | OUTPATIENT
Start: 2021-12-23

## 2021-12-23 NOTE — ED PROVIDER NOTES
EMERGENCY DEPARTMENT HISTORY AND PHYSICAL EXAM    2:55 PM      Date: 12/23/2021  Patient Name: Tiana Cantu    History of Presenting Illness     Chief Complaint   Patient presents with    Physical         History Provided By: Patient and EMS  Location/Duration/Severity/Modifying factors   The patient is a 14-year-old male with a history of polysubstance abuse the presents emergency department after being found dancing and hitting himself in the street per EMS reports. In the emergency department the patient has no complaints and says he admits to using heroin and used too much. The patient says that he is not homeless and lives with somebody and has had this happen before. Patient does not want to stay nor does he want to go into treatment at this time. Patient admits to smoking cigarettes, drinking alcohol periodically, admits to heroin abuse. Patient notes he is too much heroin today and that is why he is acting differently than normal.  The patient denies any other aggravating or alleviating factors. PCP: None    Current Outpatient Medications   Medication Sig Dispense Refill    naloxone (Narcan) 4 mg/actuation nasal spray Use 1 spray intranasally, then discard. Repeat with new spray every 2 min as needed for opioid overdose symptoms, alternating nostrils. 2 Each 0       Past History     Past Medical History:  Past Medical History:   Diagnosis Date    Heroin addiction ([facility] [address]        Past Surgical History:  Past Surgical History:   Procedure Laterality Date    HX HERNIA REPAIR         Family History:  Family History   Family history unknown: Yes       Social History:  Social History     Tobacco Use    Smoking status: Current Every Day Smoker    Smokeless tobacco: Never Used   Substance Use Topics    Alcohol use:  Yes     Alcohol/week: 10.0 standard drinks     Types: 10 Cans of beer per week     Comment: DAILY ALCOHOL INTAKE    Drug use: Yes     Frequency: 2.0 times per week     Types: Heroin Allergies:  No Known Allergies      Review of Systems       Review of Systems   Constitutional: Negative for activity change, fatigue and fever. HENT: Negative for congestion and rhinorrhea. Eyes: Negative for visual disturbance. Respiratory: Negative for shortness of breath. Cardiovascular: Negative for chest pain and palpitations. Gastrointestinal: Negative for abdominal pain, diarrhea, nausea and vomiting. Genitourinary: Negative for dysuria and hematuria. Musculoskeletal: Negative for back pain. Skin: Negative for rash. Neurological: Negative for dizziness, weakness and light-headedness. Psychiatric/Behavioral: Positive for behavioral problems. Negative for agitation and suicidal ideas. The patient is hyperactive. All other systems reviewed and are negative. Physical Exam     Visit Vitals  /69 (BP 1 Location: Left upper arm, BP Patient Position: At rest)   Pulse 86   Temp 98.2 °F (36.8 °C)   Resp 20   Ht 6' 1\" (1.854 m)   Wt 74.8 kg (165 lb)   SpO2 100%   BMI 21.77 kg/m²         Physical Exam  Vitals and nursing note reviewed. Constitutional:       General: He is not in acute distress. Appearance: He is well-developed. Comments: Poor hygiene, dancing, intermittently yelling but easily redirected   HENT:      Head: Normocephalic and atraumatic. Right Ear: External ear normal.      Left Ear: External ear normal.      Nose: Nose normal.   Eyes:      General: No scleral icterus. Conjunctiva/sclera: Conjunctivae normal.      Pupils: Pupils are equal, round, and reactive to light. Neck:      Thyroid: No thyromegaly. Vascular: No JVD. Trachea: No tracheal deviation. Cardiovascular:      Rate and Rhythm: Normal rate and regular rhythm. Heart sounds: Normal heart sounds. No murmur heard. No friction rub. No gallop. Pulmonary:      Effort: Pulmonary effort is normal.      Breath sounds: Normal breath sounds.    Chest:      Chest wall: No tenderness. Abdominal:      General: Bowel sounds are normal. There is no distension. Palpations: Abdomen is soft. Tenderness: There is no abdominal tenderness. There is no guarding or rebound. Musculoskeletal:         General: No tenderness. Normal range of motion. Cervical back: Normal range of motion and neck supple. Lymphadenopathy:      Cervical: No cervical adenopathy. Skin:     General: Skin is warm and dry. Neurological:      Mental Status: He is alert and oriented to person, place, and time. Cranial Nerves: No cranial nerve deficit. Coordination: Coordination normal.      Comments: No sensory loss, Gait normal, Motor 5/5   Psychiatric:      Comments: No SI or HI           Diagnostic Study Results     Labs -  No results found for this or any previous visit (from the past 12 hour(s)). Radiologic Studies -   No orders to display         Medical Decision Making   I am the first provider for this patient. I reviewed the vital signs, available nursing notes, past medical history, past surgical history, family history and social history. Vital Signs-Reviewed the patient's vital signs. Records Reviewed: Nursing Notes, Old Medical Records, Previous Radiology Studies and Previous Laboratory Studies (Time of Review: 2:55 PM)    ED Course: Progress Notes, Reevaluation, and Consults: The patient again was reevaluated and is no longer intoxicated and oriented x4. The patient will be referred to the community services Board and return if at all worsened or concerned. Workup and recommendations were reviewed with the patient and all questions were answered. The patient understands the plan and will proceed with close outpatient care. I have encouraged the patient to return if at all worsened or concerned.    Mishel Quinones,  2:58 PM      Provider Notes (Medical Decision Making):   MDM  Number of Diagnoses or Management Options  Heroin abuse ([facility] [address]  Diagnosis management comments: The patient is a 77-year-old male with a history of polysubstance abuse who presents emergency department after exhibiting bizarre behavior on the street. The emergency department the patient is dancing, clapping, and intermittently nodding off. After observation the patient continued to improve and became oriented x4 and said he just used too much heroin. I discussed the case with the community services Board regarding CSU placement and they said they be happy to evaluate him but the patient is not interested in going into treatment. The patient is oriented x4 now and insisting on leaving and have no reason to keep him in the ED as he is no longer intoxicated. I have encouraged the patient to follow-up with the community services Board and to seek treatment for his substance abuse disorder. The patient understands the plan and will return if at all worsened or concerned. Tamiko Brochure, DO 2:57 PM        Procedures      Diagnosis     Clinical Impression:   1. Heroin abuse ([facility] [address]        Disposition: DC    Follow-up Information    None          Patient's Medications   Start Taking    No medications on file   Continue Taking    NALOXONE (NARCAN) 4 MG/ACTUATION NASAL SPRAY    Use 1 spray intranasally, then discard. Repeat with new spray every 2 min as needed for opioid overdose symptoms, alternating nostrils. These Medications have changed    No medications on file   Stop Taking    No medications on file     Disclaimer: Sections of this note are dictated using utilizing voice recognition software. Minor typographical errors may be present. If questions arise, please do not hesitate to contact me or call our department. Mac Reynoso)  Orthopaedic Surgery  [address], [address] [place] [address]  Phone: [phone]  Fax: [phone]  Follow Up Time: 2 weeks

## 2021-12-24 NOTE — ED PROVIDER NOTES
HPI   Patient is a 78-year-old male who presents in police custody for medical clearance. Patient was actually already seen earlier today. He is currently under arrest for outstanding warrant. Patient denies any physical pain or discomfort at this time. He does admit to drinking alcohol and using heroin today. He has no physical complaints or concerns with exception of he would like some water. Past Medical History:   Diagnosis Date    Heroin addiction [facility])        Past Surgical History:   Procedure Laterality Date    HX HERNIA REPAIR           Family History:   Family history unknown: Yes       Social History     Socioeconomic History    Marital status: SINGLE     Spouse name: Not on file    Number of children: Not on file    Years of education: Not on file    Highest education level: Not on file   Occupational History    Not on file   Tobacco Use    Smoking status: Current Every Day Smoker    Smokeless tobacco: Never Used   Substance and Sexual Activity    Alcohol use: Yes     Alcohol/week: 10.0 standard drinks     Types: 10 Cans of beer per week     Comment: DAILY ALCOHOL INTAKE    Drug use: Yes     Frequency: 2.0 times per week     Types: Heroin    Sexual activity: Yes     Partners: Female     Birth control/protection: None   Other Topics Concern    Not on file   Social History Narrative    Not on file     Social Determinants of Health     Financial Resource Strain:     Difficulty of Paying Living Expenses: Not on file   Food Insecurity:     Worried About Running Out of Food in the Last Year: Not on file    Sahra of Food in the Last Year: Not on file   Transportation Needs:     Lack of Transportation (Medical): Not on file    Lack of Transportation (Non-Medical):  Not on file   Physical Activity:     Days of Exercise per Week: Not on file    Minutes of Exercise per Session: Not on file   Stress:     Feeling of Stress : Not on file   Social Connections:     Frequency of Communication with Friends and Family: Not on file    Frequency of Social Gatherings with Friends and Family: Not on file    Attends Shinto Services: Not on file    Active Member of Clubs or Organizations: Not on file    Attends Club or Organization Meetings: Not on file    Marital Status: Not on file   Intimate Partner Violence:     Fear of Current or Ex-Partner: Not on file    Emotionally Abused: Not on file    Physically Abused: Not on file    Sexually Abused: Not on file   Housing Stability:     Unable to Pay for Housing in the Last Year: Not on file    Number of [place] in the Last Year: Not on file    Unstable Housing in the Last Year: Not on file         ALLERGIES: Patient has no known allergies. Review of Systems  Constitutional: No fever  HENT: No ear pain  Eyes: No change in vision  Respiratory: No SOB  Cardio: No chest pain  GI: No blood in stool  : No hematuria  MSK: No back pain  Skin: No rashes  Neuro: No headache    Vitals:    12/23/21 2309   BP: 122/78   Pulse: 89   Resp: 22   Temp: 98.4 °F (36.9 °C)   SpO2: 96%            Physical Exam   General: No acute distress  Head: Normocephalic, atraumatic  Psych: Cooperative and alert  Eyes: No scleral icterus, normal conjunctiva, pupils 2 mm, equal and reactive  ENT: Partially dry oral mucosa  Neck: Supple, nontender, no step-offs or deformities, normal range of motion  CV: Regular rate and rhythm, no pitting edema, palpable radial pulses  Pulm: Clear breath sounds bilaterally without any wheezing or rhonchi, normal respiratory rate  GI: Normal bowel sounds, soft, non-tender  MSK: Moves all four extremities, no bony process tenderness of bilateral upper and lower extremities  Skin: No rashes, no track marks noted  Neuro: Alert and conversive      [facility]     Patient is a 28-year-old male who presents in police custody for medical clearance. Patient does seem mildly intoxicated and does admit to using alcohol and heroin.   He is not showing any specific opioid toxidrome, is otherwise hemodynamically stable, does not have any other concerning findings of trauma or injury. Otherwise has normal exam.  Is able to answer all my questions appropriately. Overall I have no concerns for acute decompensation in this patient or other concerning findings patient also has no physical complaints therefore I feel that he is medically cleared. Patient stable for discharge at this time. Patient is in agreement with the plan to be discharged at this time. All the patient's questions were answered. Patient was given written instructions on the diagnosis, and states understanding of the plan moving forward. We did discuss important signs and symptoms that should prompt quick return to the emergency department. Disposition: Patient was discharged home in stable condition.   They will follow up with [facility]    Prescriptions: None    Diagnosis: Acute alcohol intoxication  Acute heroin use  Procedures

## 2021-12-24 NOTE — ED TRIAGE NOTES
Pt to ED ion police custody with reports of heroin use today. Per Mindy PEACOCK, bystanders called 911 for pt running around in traffic. Pt reports using three Heroin caps today. PD states pt is under arrest for Failure to appear in court ut they wanted to be sure he was medically clear. Pt presents awake alert and oriented x 4, restless at time of triage, cooperative and following commands.

## 2021-12-24 NOTE — ED NOTES
I have reviewed discharge instructions with the patient. The patient verbalized understanding. Pt discharged from ED in custody of [place], stable in no distress.

## 2023-02-27 ENCOUNTER — APPOINTMENT (OUTPATIENT)
Facility: HOSPITAL | Age: 38
DRG: 912 | End: 2023-02-27
Payer: MEDICAID

## 2023-02-27 ENCOUNTER — HOSPITAL ENCOUNTER (INPATIENT)
Facility: HOSPITAL | Age: 38
LOS: 3 days | Discharge: HOME | DRG: 912 | End: 2023-03-02
Attending: EMERGENCY MEDICINE | Admitting: INTERNAL MEDICINE
Payer: MEDICAID

## 2023-02-27 DIAGNOSIS — R93.7 ABNORMAL CT SCAN, CERVICAL SPINE: Primary | ICD-10-CM

## 2023-02-27 DIAGNOSIS — S22.000A CLOSED WEDGE FRACTURE OF THORACIC VERTEBRA, UNSPECIFIED THORACIC VERTEBRAL LEVEL, INITIAL ENCOUNTER (HCC): ICD-10-CM

## 2023-02-27 DIAGNOSIS — S13.4XXA INJURY TO LIGAMENT OF CERVICAL SPINE, INITIAL ENCOUNTER: ICD-10-CM

## 2023-02-27 PROBLEM — S14.109A ACUTE TRAUMATIC INJURY OF CERVICAL SPINE (HCC): Status: ACTIVE | Noted: 2023-02-27

## 2023-02-27 PROBLEM — T14.90XS: Status: ACTIVE | Noted: 2023-02-27

## 2023-02-27 PROBLEM — M53.2X9: Status: ACTIVE | Noted: 2023-02-27

## 2023-02-27 LAB
AMPHET UR QL SCN: NEGATIVE
ANION GAP SERPL CALC-SCNC: 10 MMOL/L (ref 3–18)
BARBITURATES UR QL SCN: NEGATIVE
BASOPHILS # BLD: 0 K/UL (ref 0–0.1)
BASOPHILS NFR BLD: 0 % (ref 0–2)
BENZODIAZ UR QL: NEGATIVE
BUN SERPL-MCNC: 8 MG/DL (ref 7–18)
BUN/CREAT SERPL: 11 (ref 12–20)
CALCIUM SERPL-MCNC: 9.4 MG/DL (ref 8.5–10.1)
CANNABINOIDS UR QL SCN: POSITIVE
CHLORIDE SERPL-SCNC: 99 MMOL/L (ref 100–111)
CO2 SERPL-SCNC: 25 MMOL/L (ref 21–32)
COCAINE UR QL SCN: NEGATIVE
CREAT SERPL-MCNC: 0.74 MG/DL (ref 0.6–1.3)
CRP SERPL-MCNC: 1.7 MG/DL (ref 0–0.3)
DIFFERENTIAL METHOD BLD: ABNORMAL
EKG ATRIAL RATE: 90 BPM
EKG DIAGNOSIS: NORMAL
EKG P AXIS: 54 DEGREES
EKG P-R INTERVAL: 118 MS
EKG Q-T INTERVAL: 334 MS
EKG QRS DURATION: 88 MS
EKG QTC CALCULATION (BAZETT): 408 MS
EKG R AXIS: 72 DEGREES
EKG T AXIS: 62 DEGREES
EKG VENTRICULAR RATE: 90 BPM
EOSINOPHIL # BLD: 0 K/UL (ref 0–0.4)
EOSINOPHIL NFR BLD: 0 % (ref 0–5)
ERYTHROCYTE [DISTWIDTH] IN BLOOD BY AUTOMATED COUNT: 13.1 % (ref 11.6–14.5)
ERYTHROCYTE [SEDIMENTATION RATE] IN BLOOD: 57 MM/HR (ref 0–15)
ETHANOL SERPL-MCNC: <3 MG/DL (ref 0–3)
GLUCOSE SERPL-MCNC: 128 MG/DL (ref 74–99)
HCT VFR BLD AUTO: 42 % (ref 36–48)
HGB BLD-MCNC: 13.6 G/DL (ref 13–16)
IMM GRANULOCYTES # BLD AUTO: 0 K/UL (ref 0–0.04)
IMM GRANULOCYTES NFR BLD AUTO: 0 % (ref 0–0.5)
INR PPP: 1 (ref 0.8–1.2)
LACTATE SERPL-SCNC: 1.9 MMOL/L (ref 0.4–2)
LYMPHOCYTES # BLD: 1.7 K/UL (ref 0.9–3.6)
LYMPHOCYTES NFR BLD: 25 % (ref 21–52)
Lab: ABNORMAL
MCH RBC QN AUTO: 29.8 PG (ref 24–34)
MCHC RBC AUTO-ENTMCNC: 32.4 G/DL (ref 31–37)
MCV RBC AUTO: 92.1 FL (ref 78–100)
METHADONE UR QL: NEGATIVE
MONOCYTES # BLD: 0.5 K/UL (ref 0.05–1.2)
MONOCYTES NFR BLD: 7 % (ref 3–10)
NEUTS SEG # BLD: 4.5 K/UL (ref 1.8–8)
NEUTS SEG NFR BLD: 67 % (ref 40–73)
NRBC # BLD: 0 K/UL (ref 0–0.01)
NRBC BLD-RTO: 0 PER 100 WBC
OPIATES UR QL: POSITIVE
PCP UR QL: NEGATIVE
PLATELET # BLD AUTO: 576 K/UL (ref 135–420)
PMV BLD AUTO: 8.6 FL (ref 9.2–11.8)
POTASSIUM SERPL-SCNC: 3.8 MMOL/L (ref 3.5–5.5)
PROTHROMBIN TIME: 13.7 SEC (ref 11.5–15.2)
RBC # BLD AUTO: 4.56 M/UL (ref 4.35–5.65)
SODIUM SERPL-SCNC: 134 MMOL/L (ref 136–145)
WBC # BLD AUTO: 6.8 K/UL (ref 4.6–13.2)

## 2023-02-27 PROCEDURE — 85025 COMPLETE CBC W/AUTO DIFF WBC: CPT

## 2023-02-27 PROCEDURE — 96365 THER/PROPH/DIAG IV INF INIT: CPT

## 2023-02-27 PROCEDURE — 1100000000 HC RM PRIVATE

## 2023-02-27 PROCEDURE — 71260 CT THORAX DX C+: CPT

## 2023-02-27 PROCEDURE — 6360000002 HC RX W HCPCS: Performed by: EMERGENCY MEDICINE

## 2023-02-27 PROCEDURE — 2580000003 HC RX 258: Performed by: PHYSICIAN ASSISTANT

## 2023-02-27 PROCEDURE — 96375 TX/PRO/DX INJ NEW DRUG ADDON: CPT

## 2023-02-27 PROCEDURE — 90471 IMMUNIZATION ADMIN: CPT | Performed by: PHYSICIAN ASSISTANT

## 2023-02-27 PROCEDURE — 93005 ELECTROCARDIOGRAM TRACING: CPT | Performed by: PHYSICIAN ASSISTANT

## 2023-02-27 PROCEDURE — 80048 BASIC METABOLIC PNL TOTAL CA: CPT

## 2023-02-27 PROCEDURE — 83605 ASSAY OF LACTIC ACID: CPT

## 2023-02-27 PROCEDURE — 85652 RBC SED RATE AUTOMATED: CPT

## 2023-02-27 PROCEDURE — 6360000004 HC RX CONTRAST MEDICATION: Performed by: PHYSICIAN ASSISTANT

## 2023-02-27 PROCEDURE — 82077 ASSAY SPEC XCP UR&BREATH IA: CPT

## 2023-02-27 PROCEDURE — 99223 1ST HOSP IP/OBS HIGH 75: CPT | Performed by: INTERNAL MEDICINE

## 2023-02-27 PROCEDURE — 96361 HYDRATE IV INFUSION ADD-ON: CPT

## 2023-02-27 PROCEDURE — 90471 IMMUNIZATION ADMIN: CPT

## 2023-02-27 PROCEDURE — 6360000002 HC RX W HCPCS: Performed by: PHYSICIAN ASSISTANT

## 2023-02-27 PROCEDURE — 96376 TX/PRO/DX INJ SAME DRUG ADON: CPT

## 2023-02-27 PROCEDURE — 70450 CT HEAD/BRAIN W/O DYE: CPT

## 2023-02-27 PROCEDURE — 90714 TD VACC NO PRESV 7 YRS+ IM: CPT | Performed by: PHYSICIAN ASSISTANT

## 2023-02-27 PROCEDURE — 72146 MRI CHEST SPINE W/O DYE: CPT

## 2023-02-27 PROCEDURE — 85610 PROTHROMBIN TIME: CPT

## 2023-02-27 PROCEDURE — 94761 N-INVAS EAR/PLS OXIMETRY MLT: CPT

## 2023-02-27 PROCEDURE — 96374 THER/PROPH/DIAG INJ IV PUSH: CPT

## 2023-02-27 PROCEDURE — 2140000001 HC CVICU INTERMEDIATE R&B

## 2023-02-27 PROCEDURE — 72125 CT NECK SPINE W/O DYE: CPT

## 2023-02-27 PROCEDURE — 86140 C-REACTIVE PROTEIN: CPT

## 2023-02-27 PROCEDURE — 80307 DRUG TEST PRSMV CHEM ANLYZR: CPT

## 2023-02-27 PROCEDURE — 87040 BLOOD CULTURE FOR BACTERIA: CPT

## 2023-02-27 PROCEDURE — 99285 EMERGENCY DEPT VISIT HI MDM: CPT

## 2023-02-27 PROCEDURE — 73030 X-RAY EXAM OF SHOULDER: CPT

## 2023-02-27 RX ORDER — ONDANSETRON 2 MG/ML
4 INJECTION INTRAMUSCULAR; INTRAVENOUS
Status: COMPLETED | OUTPATIENT
Start: 2023-02-27 | End: 2023-02-27

## 2023-02-27 RX ORDER — SODIUM CHLORIDE 9 MG/ML
INJECTION, SOLUTION INTRAVENOUS PRN
Status: DISCONTINUED | OUTPATIENT
Start: 2023-02-27 | End: 2023-03-02 | Stop reason: HOSPADM

## 2023-02-27 RX ORDER — MORPHINE SULFATE 4 MG/ML
4 INJECTION, SOLUTION INTRAMUSCULAR; INTRAVENOUS
Status: COMPLETED | OUTPATIENT
Start: 2023-02-27 | End: 2023-02-27

## 2023-02-27 RX ORDER — 0.9 % SODIUM CHLORIDE 0.9 %
1000 INTRAVENOUS SOLUTION INTRAVENOUS ONCE
Status: COMPLETED | OUTPATIENT
Start: 2023-02-27 | End: 2023-02-27

## 2023-02-27 RX ORDER — SODIUM CHLORIDE 0.9 % (FLUSH) 0.9 %
5-40 SYRINGE (ML) INJECTION EVERY 12 HOURS SCHEDULED
Status: DISCONTINUED | OUTPATIENT
Start: 2023-02-28 | End: 2023-03-02 | Stop reason: HOSPADM

## 2023-02-27 RX ORDER — HYDROMORPHONE HYDROCHLORIDE 1 MG/ML
0.5 INJECTION, SOLUTION INTRAMUSCULAR; INTRAVENOUS; SUBCUTANEOUS
Status: COMPLETED | OUTPATIENT
Start: 2023-02-27 | End: 2023-02-27

## 2023-02-27 RX ORDER — METHYLPREDNISOLONE SODIUM SUCCINATE 125 MG/2ML
125 INJECTION, POWDER, LYOPHILIZED, FOR SOLUTION INTRAMUSCULAR; INTRAVENOUS
Status: COMPLETED | OUTPATIENT
Start: 2023-02-27 | End: 2023-02-27

## 2023-02-27 RX ORDER — ONDANSETRON 4 MG/1
4 TABLET, ORALLY DISINTEGRATING ORAL EVERY 8 HOURS PRN
Status: DISCONTINUED | OUTPATIENT
Start: 2023-02-27 | End: 2023-03-02 | Stop reason: HOSPADM

## 2023-02-27 RX ORDER — ENOXAPARIN SODIUM 100 MG/ML
40 INJECTION SUBCUTANEOUS DAILY
Status: DISCONTINUED | OUTPATIENT
Start: 2023-02-28 | End: 2023-03-02 | Stop reason: HOSPADM

## 2023-02-27 RX ORDER — METHYLPREDNISOLONE SODIUM SUCCINATE 125 MG/2ML
80 INJECTION, POWDER, LYOPHILIZED, FOR SOLUTION INTRAMUSCULAR; INTRAVENOUS EVERY 6 HOURS
Status: DISCONTINUED | OUTPATIENT
Start: 2023-02-28 | End: 2023-02-28

## 2023-02-27 RX ORDER — ONDANSETRON 2 MG/ML
4 INJECTION INTRAMUSCULAR; INTRAVENOUS EVERY 6 HOURS PRN
Status: DISCONTINUED | OUTPATIENT
Start: 2023-02-27 | End: 2023-03-02 | Stop reason: HOSPADM

## 2023-02-27 RX ORDER — ACETAMINOPHEN 325 MG/1
650 TABLET ORAL EVERY 6 HOURS PRN
Status: DISCONTINUED | OUTPATIENT
Start: 2023-02-27 | End: 2023-03-02 | Stop reason: HOSPADM

## 2023-02-27 RX ORDER — SENNA PLUS 8.6 MG/1
1 TABLET ORAL 2 TIMES DAILY
Status: DISCONTINUED | OUTPATIENT
Start: 2023-02-28 | End: 2023-03-02 | Stop reason: HOSPADM

## 2023-02-27 RX ORDER — FAMOTIDINE 20 MG/1
20 TABLET, FILM COATED ORAL 2 TIMES DAILY
Status: DISCONTINUED | OUTPATIENT
Start: 2023-02-28 | End: 2023-03-02 | Stop reason: HOSPADM

## 2023-02-27 RX ORDER — TETANUS AND DIPHTHERIA TOXOIDS ADSORBED 2; 2 [LF]/.5ML; [LF]/.5ML
0.5 INJECTION INTRAMUSCULAR ONCE
Status: COMPLETED | OUTPATIENT
Start: 2023-02-27 | End: 2023-02-27

## 2023-02-27 RX ORDER — SODIUM CHLORIDE 0.9 % (FLUSH) 0.9 %
5-40 SYRINGE (ML) INJECTION PRN
Status: DISCONTINUED | OUTPATIENT
Start: 2023-02-27 | End: 2023-03-02 | Stop reason: HOSPADM

## 2023-02-27 RX ORDER — ACETAMINOPHEN 650 MG/1
650 SUPPOSITORY RECTAL EVERY 6 HOURS PRN
Status: DISCONTINUED | OUTPATIENT
Start: 2023-02-27 | End: 2023-03-02 | Stop reason: HOSPADM

## 2023-02-27 RX ADMIN — MORPHINE SULFATE 4 MG: 4 INJECTION, SOLUTION INTRAMUSCULAR; INTRAVENOUS at 09:42

## 2023-02-27 RX ADMIN — METHYLPREDNISOLONE SODIUM SUCCINATE 125 MG: 125 INJECTION, POWDER, FOR SOLUTION INTRAMUSCULAR; INTRAVENOUS at 23:08

## 2023-02-27 RX ADMIN — ONDANSETRON 4 MG: 2 INJECTION INTRAMUSCULAR; INTRAVENOUS at 09:42

## 2023-02-27 RX ADMIN — IOPAMIDOL 70 ML: 612 INJECTION, SOLUTION INTRAVENOUS at 12:24

## 2023-02-27 RX ADMIN — MORPHINE SULFATE 4 MG: 4 INJECTION, SOLUTION INTRAMUSCULAR; INTRAVENOUS at 23:08

## 2023-02-27 RX ADMIN — MORPHINE SULFATE 4 MG: 4 INJECTION, SOLUTION INTRAMUSCULAR; INTRAVENOUS at 19:06

## 2023-02-27 RX ADMIN — SODIUM CHLORIDE 1000 ML: 9 INJECTION, SOLUTION INTRAVENOUS at 09:42

## 2023-02-27 RX ADMIN — HYDROMORPHONE HYDROCHLORIDE 0.5 MG: 1 INJECTION, SOLUTION INTRAMUSCULAR; INTRAVENOUS; SUBCUTANEOUS at 16:38

## 2023-02-27 RX ADMIN — MORPHINE SULFATE 4 MG: 4 INJECTION, SOLUTION INTRAMUSCULAR; INTRAVENOUS at 11:29

## 2023-02-27 RX ADMIN — PIPERACILLIN AND TAZOBACTAM 4500 MG: 4; .5 INJECTION, POWDER, FOR SOLUTION INTRAVENOUS; PARENTERAL at 23:09

## 2023-02-27 RX ADMIN — HYDROMORPHONE HYDROCHLORIDE 0.5 MG: 1 INJECTION, SOLUTION INTRAMUSCULAR; INTRAVENOUS; SUBCUTANEOUS at 14:30

## 2023-02-27 RX ADMIN — TETANUS AND DIPHTHERIA TOXOIDS ADSORBED 0.5 ML: 2; 2 INJECTION INTRAMUSCULAR at 11:06

## 2023-02-27 ASSESSMENT — ENCOUNTER SYMPTOMS
BACK PAIN: 1
NAUSEA: 0
DIARRHEA: 0
VOMITING: 0
SHORTNESS OF BREATH: 0
ABDOMINAL PAIN: 0

## 2023-02-27 NOTE — PROGRESS NOTES
MRI screening form needs to be filled out and faxed to [phone] BEFORE MRI can be scheduled. If unable to obtain information from patient , [employer] needs to be contacted .  If patient is claustrophobic or will needs pain meds, please have ordered in advance in order to facilitate exam.

## 2023-02-27 NOTE — DISCHARGE INSTRUCTIONS
Take medication as prescribed. Follow-up with spine surgeon within 2 days for reassessment. Bring the results from this visit with you for their review. Return to the ED immediately for any new, worsening, or persistent symptoms, including fever, weakness, or any other medical concerns.

## 2023-02-27 NOTE — ED TRIAGE NOTES
Patient fell from one story building 2 days ago and landed flat on his back. Denies any LOC but states that he now has pain on his L should and upper back and R shoulder.

## 2023-02-27 NOTE — ED PROVIDER NOTES
EMERGENCY DEPARTMENT HISTORY AND PHYSICAL EXAM    6:46 PM      Date: 2/27/2023  Patient Name: Daniela Choe    History of Presenting Illness     Chief Complaint   Patient presents with    Fall    Back Pain         History Provided By: the patient. Additional History (Context): Daniela Choe is a 40 y.o. male with   Past Medical History:   Diagnosis Date    Heroin addiction ([facility] [address]     who presents with c/o neck pain, upper back pain and L shoulder pain after fall that occurred on Friday. Pt notes he fell backwards off a one story building around 3pm Friday. Pt notes he did not hit his head. Notes he has been taking Motrin and Tylenol for symptoms without relief. Notes pain is worse with movement. Denies loss of consciousness, neck pain, chest pain, shortness of breath, abdominal pain, nausea or vomiting. Denies blood thinner use. Unsure last tetanus shot. PCP: No primary care provider on file. Current Facility-Administered Medications   Medication Dose Route Frequency Provider Last Rate Last Admin    morphine sulfate (PF) injection 4 mg  4 mg IntraVENous NOW [place]         Current Outpatient Medications   Medication Sig Dispense Refill    naloxone (NARCAN) 4 MG/0.1ML LIQD nasal spray Use 1 spray intranasally, then discard. Repeat with new spray every 2 min as needed for opioid overdose symptoms, alternating nostrils. Past History     Past Medical History:  Past Medical History:   Diagnosis Date    Heroin addiction [facility])        Past Surgical History:  Past Surgical History:   Procedure Laterality Date    HERNIA REPAIR         Family History:  No family history on file. Social History:  Social History     Tobacco Use    Smoking status: Every Day    Smokeless tobacco: Never   Substance Use Topics    Alcohol use:  Yes     Alcohol/week: 10.0 standard drinks    Drug use: Yes     Frequency: 2.0 times per week     Types: Heroin       Allergies:  No Known Allergies      Review of Systems       Review of Systems   Constitutional:  Negative for chills and fever. Respiratory:  Negative for shortness of breath. Cardiovascular:  Negative for chest pain. Gastrointestinal:  Negative for abdominal pain, diarrhea, nausea and vomiting. Musculoskeletal:  Positive for arthralgias, back pain and myalgias. Skin:  Negative for rash. All other systems reviewed and are negative. Physical Exam   /80   Pulse 86   Temp 98.1 °F (36.7 °C) (Oral)   Resp 16   Ht 6' 4\" (1.93 m)   Wt 18 lb (8.165 kg)   SpO2 100%   BMI 2.19 kg/m²       Physical Exam  Vitals and nursing note reviewed. Constitutional:       General: He is not in acute distress. Appearance: Normal appearance. He is not ill-appearing, toxic-appearing or diaphoretic. HENT:      Head: Normocephalic and atraumatic. Eyes:      Pupils: Pupils are equal, round, and reactive to light. Cardiovascular:      Rate and Rhythm: Normal rate and regular rhythm. Pulmonary:      Effort: Pulmonary effort is normal.      Breath sounds: Normal breath sounds. Chest:      Chest wall: No tenderness. Abdominal:      General: Abdomen is flat. There is no distension. Palpations: Abdomen is soft. Tenderness: There is no abdominal tenderness. There is no guarding or rebound. Musculoskeletal:      Left shoulder: Bony tenderness present. No swelling or deformity. Decreased range of motion (pain with abduction). Normal strength. Normal pulse. Left elbow: Normal.      Cervical back: Normal range of motion and neck supple. Bony tenderness present. No swelling, edema, deformity, signs of trauma or crepitus. Normal range of motion. Thoracic back: Bony tenderness present. No swelling, edema, deformity or signs of trauma. Lumbar back: Normal.      Comments: Small, scattered abrasions throughout back    No ecchymosis or open wounds    Skin:     General: Skin is warm. Findings: No rash.    Neurological: General: No focal deficit present. Mental Status: He is alert and oriented to person, place, and time. Cranial Nerves: No cranial nerve deficit. Sensory: No sensory deficit. Motor: No weakness.       Coordination: Coordination normal.      Gait: Gait normal.         Diagnostic Study Results     Labs -  Recent Results (from the past 12 hour(s))   CBC with Auto Differential    Collection Time: 02/27/23  9:40 AM   Result Value Ref Range    WBC 6.8 4.6 - 13.2 K/uL    RBC 4.56 4.35 - 5.65 M/uL    Hemoglobin 13.6 13.0 - 16.0 g/dL    Hematocrit 42.0 36.0 - 48.0 %    MCV 92.1 78.0 - 100.0 FL    MCH 29.8 24.0 - 34.0 PG    MCHC 32.4 31.0 - 37.0 g/dL    RDW 13.1 11.6 - 14.5 %    Platelets 432 (H) 711 - 420 K/uL    MPV 8.6 (L) 9.2 - 11.8 FL    Nucleated RBCs 0.0 0  WBC    nRBC 0.00 0.00 - 0.01 K/uL    Seg Neutrophils 67 40 - 73 %    Lymphocytes 25 21 - 52 %    Monocytes 7 3 - 10 %    Eosinophils % 0 0 - 5 %    Basophils 0 0 - 2 %    Immature Granulocytes 0 0.0 - 0.5 %    Segs Absolute 4.5 1.8 - 8.0 K/UL    Absolute Lymph # 1.7 0.9 - 3.6 K/UL    Absolute Mono # 0.5 0.05 - 1.2 K/UL    Absolute Eos # 0.0 0.0 - 0.4 K/UL    Basophils Absolute 0.0 0.0 - 0.1 K/UL    Absolute Immature Granulocyte 0.0 0.00 - 0.04 K/UL    Differential Type AUTOMATED     Basic Metabolic Panel    Collection Time: 02/27/23  9:40 AM   Result Value Ref Range    Sodium 134 (L) 136 - 145 mmol/L    Potassium 3.8 3.5 - 5.5 mmol/L    Chloride 99 (L) 100 - 111 mmol/L    CO2 25 21 - 32 mmol/L    Anion Gap 10 3.0 - 18 mmol/L    Glucose 128 (H) 74 - 99 mg/dL    BUN 8 7.0 - 18 MG/DL    Creatinine 0.74 0.6 - 1.3 MG/DL    Bun/Cre Ratio 11 (L) 12 - 20      Est, Glom Filt Rate >60 >60 ml/min/1.73m2    Calcium 9.4 8.5 - 10.1 MG/DL   Protime-INR    Collection Time: 02/27/23  9:40 AM   Result Value Ref Range    Protime 13.7 11.5 - 15.2 sec    INR 1.0 0.8 - 1.2     Sedimentation Rate    Collection Time: 02/27/23  9:40 AM   Result Value Ref Range Sed Rate, Automated 57 (H) 0 - 15 mm/hr   C-Reactive Protein    Collection Time: 02/27/23  9:40 AM   Result Value Ref Range    CRP 1.7 (H) 0 - 0.3 mg/dL   Ethanol    Collection Time: 02/27/23  9:40 AM   Result Value Ref Range    Ethanol Lvl <3 0 - 3 MG/DL   EKG 12 Lead    Collection Time: 02/27/23  9:51 AM   Result Value Ref Range    Ventricular Rate 90 BPM    Atrial Rate 90 BPM    P-R Interval 118 ms    QRS Duration 88 ms    Q-T Interval 334 ms    QTc Calculation (Bazett) 408 ms    P Axis 54 degrees    R Axis 72 degrees    T Axis 62 degrees    Diagnosis Normal sinus rhythm    Lactic Acid    Collection Time: 02/27/23  2:45 PM   Result Value Ref Range    Lactic Acid, Plasma 1.9 0.4 - 2.0 MMOL/L   Urine Drug Screen    Collection Time: 02/27/23  3:04 PM   Result Value Ref Range    Benzodiazepines, Urine Negative NEG      Barbiturates, Urine Negative NEG      THC, TH-Cannabinol, Urine Positive (A) NEG      Opiates, Urine Positive (A) NEG      PCP, Urine Negative NEG      Cocaine, Urine Negative NEG      Amphetamine, Urine Negative NEG      Methadone, Urine Negative NEG      Comments: (NOTE)        Radiologic Studies -   CT HEAD WO CONTRAST   Final Result      No acute intracranial abnormalities. CT CERVICAL SPINE WO CONTRAST   Final Result             1.  No acute fracture or subluxation. 2.  Abnormal appearance of the prevertebral soft tissue, with pronounced   prevertebral soft tissue thickening and abnormal fluid collections. Potentially   suggestive of cervical discitis centered at C4-5 and C5-6. Recommend MR for   further delineation. CT CHEST ABDOMEN PELVIS W CONTRAST Additional Contrast? None   Final Result      1. T5 and T9 with subtle anterior wedging potentially suggestive of compression   deformities. MR can be utilized to confirm compression fractures. 2.  Small air pockets in the right shoulder adjacent to the humeral head. Source for the air collections is unclear.   Query penetrating wound to the right   shoulder. 3.  No acute abnormalities elsewhere. XR SHOULDER LEFT (MIN 2 VIEWS)   Final Result   No acute fracture detected. MRI CERVICAL SPINE WO CONTRAST    (Results Pending)   MRI THORACIC SPINE WO CONTRAST    (Results Pending)         Medical Decision Making   I am the first provider for this patient. I reviewed the vital signs, available nursing notes, past medical history, past surgical history, family history and social history. Vital Signs-Reviewed the patient's vital signs. Records Reviewed: Prior medical records, Previous radiology studies, Previous laboratory studies, Previous EKGs, and Nursing notes(Time of Review: 6:46 PM)    ED Course: Progress Notes, Reevaluation, and Consults:  1:30 PM: CTs resulted. Pt denies hx of IVDA, denies opiate use. Discussed with Dr. Eva Aguero, via [address] [place] [address] Avenue, recommends MRI cervical and thoracic spine without contrast.   Updated patient with plan, in agreement. NPO     Tetanus shot was administered to R arm.     3:30 PM: Discussed with Dr. Kelli Thapa, hospitalist, recommends consulting orthopedic physician for R shoulder findings. Discussed with Dr. Chris Lunsford, orthopedic physician, unlikely septic joint. Can follow-up in office. Discussed further with Dr. Eva Aguero via [address] [place] [address], if MRIs are ok pt can likely be discharged in hard collar x 6 weeks and follow-up in office. Likely ligamentous injury to neck. Updated Dr. Kelli Thapa, hospitalist.  Hard collar placed by nurse Nica Riddle. 5:30 PM: Called MRI, pt is next to go down. PROGRESS NOTE:  8:00 PM:  Patient care will be transferred to Corinna Gallardo PA-C. Discussed available diagnostic results and care plan at length. Pending MRI x 2, re-consult Dr. Eva Aguero. Written by Huma Jeter PA-C       Diagnosis     Clinical Impression:   1. Abnormal CT scan, cervical spine    2.  Closed wedge fracture of thoracic vertebra, unspecified thoracic vertebral level, initial encounter [facility])        Disposition: TBD     [facility] EMERGENCY DEPT  [address]  [phone]    If symptoms worsen    [address]  [address]  [address] [place] [address]  [phone]    Schedule an appointment as soon as possible for a visit           Medication List        ASK your doctor about these medications      Narcan 4 MG/0.1ML Liqd nasal spray  Generic drug: naloxone               Dictation disclaimer:  Please note that this dictation was completed with [employer], the Jascha voice recognition software. Quite often unanticipated grammatical, syntax, homophones, and other interpretive errors are inadvertently transcribed by the computer software. Please disregard these errors. Please excuse any errors that have escaped final proofreading.        Marisela Wagoner  02/27/23 9899

## 2023-02-28 ENCOUNTER — ANESTHESIA EVENT (OUTPATIENT)
Facility: HOSPITAL | Age: 38
DRG: 471 | End: 2023-02-28
Payer: MEDICAID

## 2023-02-28 ENCOUNTER — APPOINTMENT (OUTPATIENT)
Facility: HOSPITAL | Age: 38
DRG: 912 | End: 2023-02-28
Payer: MEDICAID

## 2023-02-28 ENCOUNTER — ANESTHESIA (OUTPATIENT)
Facility: HOSPITAL | Age: 38
DRG: 471 | End: 2023-02-28
Payer: MEDICAID

## 2023-02-28 PROBLEM — S13.4XXA INJURY TO LIGAMENT OF CERVICAL SPINE: Status: ACTIVE | Noted: 2023-02-28

## 2023-02-28 PROBLEM — S06.4XAA TRAUMATIC EPIDURAL HEMATOMA (HCC): Status: ACTIVE | Noted: 2023-02-28

## 2023-02-28 PROBLEM — R93.7 ABNORMAL CT SCAN, CERVICAL SPINE: Status: ACTIVE | Noted: 2023-02-28

## 2023-02-28 PROBLEM — S43.402A SPRAIN OF LEFT SHOULDER: Status: ACTIVE | Noted: 2023-02-28

## 2023-02-28 LAB
ALBUMIN SERPL-MCNC: 2.6 G/DL (ref 3.4–5)
ALBUMIN/GLOB SERPL: 0.6 (ref 0.8–1.7)
ALP SERPL-CCNC: 112 U/L (ref 45–117)
ALT SERPL-CCNC: 89 U/L (ref 16–61)
ANION GAP SERPL CALC-SCNC: 5 MMOL/L (ref 3–18)
APTT PPP: 30.4 SEC (ref 23–36.4)
AST SERPL-CCNC: 24 U/L (ref 10–38)
BASOPHILS # BLD: 0 K/UL (ref 0–0.1)
BASOPHILS NFR BLD: 0 % (ref 0–2)
BILIRUB SERPL-MCNC: 0.4 MG/DL (ref 0.2–1)
BUN SERPL-MCNC: 11 MG/DL (ref 7–18)
BUN/CREAT SERPL: 17 (ref 12–20)
CALCIUM SERPL-MCNC: 8.9 MG/DL (ref 8.5–10.1)
CHLORIDE SERPL-SCNC: 104 MMOL/L (ref 100–111)
CO2 SERPL-SCNC: 26 MMOL/L (ref 21–32)
CREAT SERPL-MCNC: 0.63 MG/DL (ref 0.6–1.3)
DIFFERENTIAL METHOD BLD: ABNORMAL
EOSINOPHIL # BLD: 0 K/UL (ref 0–0.4)
EOSINOPHIL NFR BLD: 0 % (ref 0–5)
ERYTHROCYTE [DISTWIDTH] IN BLOOD BY AUTOMATED COUNT: 13.2 % (ref 11.6–14.5)
FERRITIN SERPL-MCNC: 170 NG/ML (ref 8–388)
FOLATE SERPL-MCNC: 9.3 NG/ML (ref 3.1–17.5)
GLOBULIN SER CALC-MCNC: 4.6 G/DL (ref 2–4)
GLUCOSE SERPL-MCNC: 136 MG/DL (ref 74–99)
HCT VFR BLD AUTO: 33.8 % (ref 36–48)
HGB BLD-MCNC: 11 G/DL (ref 13–16)
IMM GRANULOCYTES # BLD AUTO: 0 K/UL (ref 0–0.04)
IMM GRANULOCYTES NFR BLD AUTO: 0 % (ref 0–0.5)
IRON SATN MFR SERPL: 16 % (ref 20–50)
IRON SERPL-MCNC: 39 UG/DL (ref 50–175)
LYMPHOCYTES # BLD: 1.3 K/UL (ref 0.9–3.6)
LYMPHOCYTES NFR BLD: 24 % (ref 21–52)
MCH RBC QN AUTO: 29.8 PG (ref 24–34)
MCHC RBC AUTO-ENTMCNC: 32.5 G/DL (ref 31–37)
MCV RBC AUTO: 91.6 FL (ref 78–100)
MONOCYTES # BLD: 0.1 K/UL (ref 0.05–1.2)
MONOCYTES NFR BLD: 1 % (ref 3–10)
NEUTS SEG # BLD: 4 K/UL (ref 1.8–8)
NEUTS SEG NFR BLD: 75 % (ref 40–73)
NRBC # BLD: 0 K/UL (ref 0–0.01)
NRBC BLD-RTO: 0 PER 100 WBC
PLATELET # BLD AUTO: 441 K/UL (ref 135–420)
PMV BLD AUTO: 8.5 FL (ref 9.2–11.8)
POTASSIUM SERPL-SCNC: 4.7 MMOL/L (ref 3.5–5.5)
PROT SERPL-MCNC: 7.2 G/DL (ref 6.4–8.2)
RBC # BLD AUTO: 3.69 M/UL (ref 4.35–5.65)
SARS-COV-2 RDRP RESP QL NAA+PROBE: NOT DETECTED
SODIUM SERPL-SCNC: 135 MMOL/L (ref 136–145)
SOURCE: NORMAL
TIBC SERPL-MCNC: 248 UG/DL (ref 250–450)
VIT B12 SERPL-MCNC: 635 PG/ML (ref 211–911)
WBC # BLD AUTO: 5.4 K/UL (ref 4.6–13.2)

## 2023-02-28 PROCEDURE — 85730 THROMBOPLASTIN TIME PARTIAL: CPT

## 2023-02-28 PROCEDURE — C1889 IMPLANT/INSERT DEVICE, NOC: HCPCS | Performed by: ORTHOPAEDIC SURGERY

## 2023-02-28 PROCEDURE — 3700000000 HC ANESTHESIA ATTENDED CARE: Performed by: ORTHOPAEDIC SURGERY

## 2023-02-28 PROCEDURE — 2580000003 HC RX 258: Performed by: INTERNAL MEDICINE

## 2023-02-28 PROCEDURE — 82746 ASSAY OF FOLIC ACID SERUM: CPT

## 2023-02-28 PROCEDURE — 80053 COMPREHEN METABOLIC PANEL: CPT

## 2023-02-28 PROCEDURE — 99223 1ST HOSP IP/OBS HIGH 75: CPT | Performed by: SPECIALIST

## 2023-02-28 PROCEDURE — C1713 ANCHOR/SCREW BN/BN,TIS/BN: HCPCS | Performed by: ORTHOPAEDIC SURGERY

## 2023-02-28 PROCEDURE — 2709999900 HC NON-CHARGEABLE SUPPLY: Performed by: ORTHOPAEDIC SURGERY

## 2023-02-28 PROCEDURE — 82728 ASSAY OF FERRITIN: CPT

## 2023-02-28 PROCEDURE — 3600000012 HC SURGERY LEVEL 2 ADDTL 15MIN: Performed by: ORTHOPAEDIC SURGERY

## 2023-02-28 PROCEDURE — 85025 COMPLETE CBC W/AUTO DIFF WBC: CPT

## 2023-02-28 PROCEDURE — 2500000003 HC RX 250 WO HCPCS: Performed by: NURSE ANESTHETIST, CERTIFIED REGISTERED

## 2023-02-28 PROCEDURE — 6360000002 HC RX W HCPCS: Performed by: NURSE ANESTHETIST, CERTIFIED REGISTERED

## 2023-02-28 PROCEDURE — 6360000002 HC RX W HCPCS: Performed by: ORTHOPAEDIC SURGERY

## 2023-02-28 PROCEDURE — 96372 THER/PROPH/DIAG INJ SC/IM: CPT

## 2023-02-28 PROCEDURE — 7100000000 HC PACU RECOVERY - FIRST 15 MIN: Performed by: ORTHOPAEDIC SURGERY

## 2023-02-28 PROCEDURE — 2580000003 HC RX 258: Performed by: ORTHOPAEDIC SURGERY

## 2023-02-28 PROCEDURE — 87635 SARS-COV-2 COVID-19 AMP PRB: CPT

## 2023-02-28 PROCEDURE — 2580000003 HC RX 258: Performed by: ANESTHESIOLOGY

## 2023-02-28 PROCEDURE — 83540 ASSAY OF IRON: CPT

## 2023-02-28 PROCEDURE — L0120 CERV FLEX N/ADJ FOAM PRE OTS: HCPCS | Performed by: ORTHOPAEDIC SURGERY

## 2023-02-28 PROCEDURE — 6370000000 HC RX 637 (ALT 250 FOR IP): Performed by: INTERNAL MEDICINE

## 2023-02-28 PROCEDURE — 0RT30ZZ RESECTION OF CERVICAL VERTEBRAL DISC, OPEN APPROACH: ICD-10-PCS | Performed by: ORTHOPAEDIC SURGERY

## 2023-02-28 PROCEDURE — 2720000010 HC SURG SUPPLY STERILE: Performed by: ORTHOPAEDIC SURGERY

## 2023-02-28 PROCEDURE — 6360000002 HC RX W HCPCS: Performed by: INTERNAL MEDICINE

## 2023-02-28 PROCEDURE — 6370000000 HC RX 637 (ALT 250 FOR IP): Performed by: ORTHOPAEDIC SURGERY

## 2023-02-28 PROCEDURE — 00NW0ZZ RELEASE CERVICAL SPINAL CORD, OPEN APPROACH: ICD-10-PCS | Performed by: ORTHOPAEDIC SURGERY

## 2023-02-28 PROCEDURE — 0RG10K0 FUSION OF CERVICAL VERTEBRAL JOINT WITH NONAUTOLOGOUS TISSUE SUBSTITUTE, ANTERIOR APPROACH, ANTERIOR COLUMN, OPEN APPROACH: ICD-10-PCS | Performed by: ORTHOPAEDIC SURGERY

## 2023-02-28 PROCEDURE — C1729 CATH, DRAINAGE: HCPCS | Performed by: ORTHOPAEDIC SURGERY

## 2023-02-28 PROCEDURE — 2500000003 HC RX 250 WO HCPCS: Performed by: ORTHOPAEDIC SURGERY

## 2023-02-28 PROCEDURE — 3600000002 HC SURGERY LEVEL 2 BASE: Performed by: ORTHOPAEDIC SURGERY

## 2023-02-28 PROCEDURE — 7100000001 HC PACU RECOVERY - ADDTL 15 MIN: Performed by: ORTHOPAEDIC SURGERY

## 2023-02-28 PROCEDURE — 1100000000 HC RM PRIVATE

## 2023-02-28 PROCEDURE — 72141 MRI NECK SPINE W/O DYE: CPT

## 2023-02-28 PROCEDURE — 2580000003 HC RX 258: Performed by: NURSE ANESTHETIST, CERTIFIED REGISTERED

## 2023-02-28 PROCEDURE — 36415 COLL VENOUS BLD VENIPUNCTURE: CPT

## 2023-02-28 PROCEDURE — 3700000001 HC ADD 15 MINUTES (ANESTHESIA): Performed by: ORTHOPAEDIC SURGERY

## 2023-02-28 DEVICE — IMPLANTABLE DEVICE: Type: IMPLANTABLE DEVICE | Site: SPINE CERVICAL | Status: FUNCTIONAL

## 2023-02-28 DEVICE — PLATE ANT CERV CONSTRND 2 LVL 40MM OZARK VIEW: Type: IMPLANTABLE DEVICE | Site: SPINE CERVICAL | Status: FUNCTIONAL

## 2023-02-28 DEVICE — SCREW SPNL L16MM DIA4MM SELF STARTING VAR ANT CERV TI OZARK: Type: IMPLANTABLE DEVICE | Site: SPINE CERVICAL | Status: FUNCTIONAL

## 2023-02-28 RX ORDER — KETOROLAC TROMETHAMINE 15 MG/ML
15 INJECTION, SOLUTION INTRAMUSCULAR; INTRAVENOUS EVERY 6 HOURS PRN
Status: DISCONTINUED | OUTPATIENT
Start: 2023-02-28 | End: 2023-02-28

## 2023-02-28 RX ORDER — POLYETHYLENE GLYCOL 3350 17 G/17G
17 POWDER, FOR SOLUTION ORAL DAILY
Status: DISCONTINUED | OUTPATIENT
Start: 2023-03-01 | End: 2023-03-02 | Stop reason: HOSPADM

## 2023-02-28 RX ORDER — GLYCOPYRROLATE 0.2 MG/ML
INJECTION INTRAMUSCULAR; INTRAVENOUS PRN
Status: DISCONTINUED | OUTPATIENT
Start: 2023-02-28 | End: 2023-02-28 | Stop reason: SDUPTHER

## 2023-02-28 RX ORDER — ISOSULFAN BLUE 50 MG/5ML
INJECTION, SOLUTION SUBCUTANEOUS PRN
Status: DISCONTINUED | OUTPATIENT
Start: 2023-02-28 | End: 2023-02-28 | Stop reason: ALTCHOICE

## 2023-02-28 RX ORDER — DEXAMETHASONE SODIUM PHOSPHATE 4 MG/ML
INJECTION, SOLUTION INTRA-ARTICULAR; INTRALESIONAL; INTRAMUSCULAR; INTRAVENOUS; SOFT TISSUE PRN
Status: DISCONTINUED | OUTPATIENT
Start: 2023-02-28 | End: 2023-02-28 | Stop reason: SDUPTHER

## 2023-02-28 RX ORDER — METAXALONE 800 MG/1
800 TABLET ORAL EVERY 8 HOURS PRN
Status: DISCONTINUED | OUTPATIENT
Start: 2023-02-28 | End: 2023-03-02 | Stop reason: HOSPADM

## 2023-02-28 RX ORDER — WATER 1000 ML/1000ML
INJECTION, SOLUTION INTRAVENOUS PRN
Status: DISCONTINUED | OUTPATIENT
Start: 2023-02-28 | End: 2023-02-28 | Stop reason: SDUPTHER

## 2023-02-28 RX ORDER — FENTANYL CITRATE 50 UG/ML
INJECTION, SOLUTION INTRAMUSCULAR; INTRAVENOUS PRN
Status: DISCONTINUED | OUTPATIENT
Start: 2023-02-28 | End: 2023-02-28 | Stop reason: SDUPTHER

## 2023-02-28 RX ORDER — DIPHENHYDRAMINE HYDROCHLORIDE 50 MG/ML
25 INJECTION INTRAMUSCULAR; INTRAVENOUS EVERY 6 HOURS PRN
Status: DISCONTINUED | OUTPATIENT
Start: 2023-02-28 | End: 2023-03-02 | Stop reason: HOSPADM

## 2023-02-28 RX ORDER — FENTANYL CITRATE 50 UG/ML
50 INJECTION, SOLUTION INTRAMUSCULAR; INTRAVENOUS AS NEEDED
Status: COMPLETED | OUTPATIENT
Start: 2023-02-28 | End: 2023-02-28

## 2023-02-28 RX ORDER — FAMOTIDINE 20 MG/1
20 TABLET, FILM COATED ORAL 2 TIMES DAILY
Status: DISCONTINUED | OUTPATIENT
Start: 2023-02-28 | End: 2023-03-02 | Stop reason: HOSPADM

## 2023-02-28 RX ORDER — SODIUM CHLORIDE 9 MG/ML
INJECTION, SOLUTION INTRAVENOUS PRN
Status: DISCONTINUED | OUTPATIENT
Start: 2023-02-28 | End: 2023-03-02 | Stop reason: HOSPADM

## 2023-02-28 RX ORDER — SODIUM CHLORIDE 0.9 % (FLUSH) 0.9 %
5-40 SYRINGE (ML) INJECTION EVERY 12 HOURS SCHEDULED
Status: DISCONTINUED | OUTPATIENT
Start: 2023-02-28 | End: 2023-02-28 | Stop reason: HOSPADM

## 2023-02-28 RX ORDER — ONDANSETRON 2 MG/ML
4 INJECTION INTRAMUSCULAR; INTRAVENOUS
Status: DISCONTINUED | OUTPATIENT
Start: 2023-02-28 | End: 2023-02-28 | Stop reason: HOSPADM

## 2023-02-28 RX ORDER — SODIUM CHLORIDE 450 MG/100ML
INJECTION, SOLUTION INTRAVENOUS CONTINUOUS
Status: DISCONTINUED | OUTPATIENT
Start: 2023-02-28 | End: 2023-03-01

## 2023-02-28 RX ORDER — HYDROMORPHONE HYDROCHLORIDE 1 MG/ML
0.5 INJECTION, SOLUTION INTRAMUSCULAR; INTRAVENOUS; SUBCUTANEOUS
Status: DISCONTINUED | OUTPATIENT
Start: 2023-02-28 | End: 2023-03-01

## 2023-02-28 RX ORDER — LIDOCAINE HYDROCHLORIDE 20 MG/ML
INJECTION, SOLUTION EPIDURAL; INFILTRATION; INTRACAUDAL; PERINEURAL PRN
Status: DISCONTINUED | OUTPATIENT
Start: 2023-02-28 | End: 2023-02-28 | Stop reason: SDUPTHER

## 2023-02-28 RX ORDER — SODIUM CHLORIDE 0.9 % (FLUSH) 0.9 %
5-40 SYRINGE (ML) INJECTION EVERY 12 HOURS SCHEDULED
Status: DISCONTINUED | OUTPATIENT
Start: 2023-02-28 | End: 2023-03-02 | Stop reason: HOSPADM

## 2023-02-28 RX ORDER — HYDROMORPHONE HCL 110MG/55ML
PATIENT CONTROLLED ANALGESIA SYRINGE INTRAVENOUS PRN
Status: DISCONTINUED | OUTPATIENT
Start: 2023-02-28 | End: 2023-02-28 | Stop reason: SDUPTHER

## 2023-02-28 RX ORDER — VANCOMYCIN HYDROCHLORIDE 1 G/20ML
INJECTION, POWDER, LYOPHILIZED, FOR SOLUTION INTRAVENOUS PRN
Status: DISCONTINUED | OUTPATIENT
Start: 2023-02-28 | End: 2023-02-28 | Stop reason: ALTCHOICE

## 2023-02-28 RX ORDER — HYDROMORPHONE HYDROCHLORIDE 2 MG/1
1 TABLET ORAL EVERY 4 HOURS PRN
Status: DISCONTINUED | OUTPATIENT
Start: 2023-02-28 | End: 2023-03-02 | Stop reason: HOSPADM

## 2023-02-28 RX ORDER — ONDANSETRON 4 MG/1
4 TABLET, ORALLY DISINTEGRATING ORAL EVERY 8 HOURS PRN
Status: DISCONTINUED | OUTPATIENT
Start: 2023-02-28 | End: 2023-03-02 | Stop reason: HOSPADM

## 2023-02-28 RX ORDER — SUCCINYLCHOLINE/SOD CL,ISO/PF 100 MG/5ML
SYRINGE (ML) INTRAVENOUS PRN
Status: DISCONTINUED | OUTPATIENT
Start: 2023-02-28 | End: 2023-02-28 | Stop reason: SDUPTHER

## 2023-02-28 RX ORDER — DEXMEDETOMIDINE HYDROCHLORIDE 100 UG/ML
INJECTION, SOLUTION INTRAVENOUS PRN
Status: DISCONTINUED | OUTPATIENT
Start: 2023-02-28 | End: 2023-02-28 | Stop reason: SDUPTHER

## 2023-02-28 RX ORDER — ROCURONIUM BROMIDE 10 MG/ML
INJECTION, SOLUTION INTRAVENOUS PRN
Status: DISCONTINUED | OUTPATIENT
Start: 2023-02-28 | End: 2023-02-28 | Stop reason: SDUPTHER

## 2023-02-28 RX ORDER — MIDAZOLAM HYDROCHLORIDE 1 MG/ML
INJECTION INTRAMUSCULAR; INTRAVENOUS PRN
Status: DISCONTINUED | OUTPATIENT
Start: 2023-02-28 | End: 2023-02-28 | Stop reason: SDUPTHER

## 2023-02-28 RX ORDER — SODIUM CHLORIDE, SODIUM LACTATE, POTASSIUM CHLORIDE, CALCIUM CHLORIDE 600; 310; 30; 20 MG/100ML; MG/100ML; MG/100ML; MG/100ML
INJECTION, SOLUTION INTRAVENOUS CONTINUOUS
Status: DISCONTINUED | OUTPATIENT
Start: 2023-02-28 | End: 2023-03-01

## 2023-02-28 RX ORDER — PROPOFOL 10 MG/ML
INJECTION, EMULSION INTRAVENOUS PRN
Status: DISCONTINUED | OUTPATIENT
Start: 2023-02-28 | End: 2023-02-28 | Stop reason: SDUPTHER

## 2023-02-28 RX ORDER — METHYLPREDNISOLONE SODIUM SUCCINATE 125 MG/2ML
80 INJECTION, POWDER, LYOPHILIZED, FOR SOLUTION INTRAMUSCULAR; INTRAVENOUS EVERY 6 HOURS
Status: DISCONTINUED | OUTPATIENT
Start: 2023-02-28 | End: 2023-03-01

## 2023-02-28 RX ORDER — OXYCODONE HYDROCHLORIDE 5 MG/1
5 TABLET ORAL EVERY 4 HOURS PRN
Status: DISCONTINUED | OUTPATIENT
Start: 2023-02-28 | End: 2023-03-02 | Stop reason: HOSPADM

## 2023-02-28 RX ORDER — DIPHENHYDRAMINE HCL 25 MG
25 CAPSULE ORAL EVERY 6 HOURS PRN
Status: DISCONTINUED | OUTPATIENT
Start: 2023-02-28 | End: 2023-03-02 | Stop reason: HOSPADM

## 2023-02-28 RX ORDER — SODIUM CHLORIDE 0.9 % (FLUSH) 0.9 %
5-40 SYRINGE (ML) INJECTION PRN
Status: DISCONTINUED | OUTPATIENT
Start: 2023-02-28 | End: 2023-03-02 | Stop reason: HOSPADM

## 2023-02-28 RX ORDER — CEFAZOLIN SODIUM 1 G/3ML
INJECTION, POWDER, FOR SOLUTION INTRAMUSCULAR; INTRAVENOUS PRN
Status: DISCONTINUED | OUTPATIENT
Start: 2023-02-28 | End: 2023-02-28 | Stop reason: SDUPTHER

## 2023-02-28 RX ORDER — SODIUM CHLORIDE 0.9 % (FLUSH) 0.9 %
500 SYRINGE (ML) INJECTION ONCE
Status: COMPLETED | OUTPATIENT
Start: 2023-02-28 | End: 2023-02-28

## 2023-02-28 RX ORDER — ONDANSETRON 2 MG/ML
INJECTION INTRAMUSCULAR; INTRAVENOUS PRN
Status: DISCONTINUED | OUTPATIENT
Start: 2023-02-28 | End: 2023-02-28 | Stop reason: SDUPTHER

## 2023-02-28 RX ORDER — ONDANSETRON 2 MG/ML
4 INJECTION INTRAMUSCULAR; INTRAVENOUS EVERY 6 HOURS PRN
Status: DISCONTINUED | OUTPATIENT
Start: 2023-02-28 | End: 2023-03-02 | Stop reason: HOSPADM

## 2023-02-28 RX ORDER — ACETAMINOPHEN 325 MG/1
650 TABLET ORAL EVERY 6 HOURS
Status: DISCONTINUED | OUTPATIENT
Start: 2023-02-28 | End: 2023-03-02 | Stop reason: HOSPADM

## 2023-02-28 RX ADMIN — DEXMEDETOMIDINE HYDROCHLORIDE 10 MCG: 100 INJECTION, SOLUTION INTRAVENOUS at 17:31

## 2023-02-28 RX ADMIN — HYDROMORPHONE HYDROCHLORIDE 0.5 MG: 1 INJECTION, SOLUTION INTRAMUSCULAR; INTRAVENOUS; SUBCUTANEOUS at 22:55

## 2023-02-28 RX ADMIN — Medication 500 ML: at 03:30

## 2023-02-28 RX ADMIN — FAMOTIDINE 20 MG: 20 TABLET ORAL at 08:40

## 2023-02-28 RX ADMIN — FENTANYL CITRATE 50 MCG: 50 INJECTION, SOLUTION INTRAMUSCULAR; INTRAVENOUS at 20:09

## 2023-02-28 RX ADMIN — HYDROMORPHONE HYDROCHLORIDE 0.25 MG: 2 INJECTION INTRAMUSCULAR; INTRAVENOUS; SUBCUTANEOUS at 18:44

## 2023-02-28 RX ADMIN — HYDROMORPHONE HYDROCHLORIDE 0.5 MG: 1 INJECTION, SOLUTION INTRAMUSCULAR; INTRAVENOUS; SUBCUTANEOUS at 13:52

## 2023-02-28 RX ADMIN — HYDROMORPHONE HYDROCHLORIDE 0.5 MG: 1 INJECTION, SOLUTION INTRAMUSCULAR; INTRAVENOUS; SUBCUTANEOUS at 06:23

## 2023-02-28 RX ADMIN — HYDROMORPHONE HYDROCHLORIDE 1 MG: 2 TABLET ORAL at 21:25

## 2023-02-28 RX ADMIN — METHYLPREDNISOLONE SODIUM SUCCINATE 80 MG: 125 INJECTION, POWDER, FOR SOLUTION INTRAMUSCULAR; INTRAVENOUS at 06:15

## 2023-02-28 RX ADMIN — ENOXAPARIN SODIUM 40 MG: 100 INJECTION SUBCUTANEOUS at 08:40

## 2023-02-28 RX ADMIN — PROPOFOL 200 MG: 10 INJECTION, EMULSION INTRAVENOUS at 16:17

## 2023-02-28 RX ADMIN — WATER 20 ML: 1 INJECTION INTRAMUSCULAR; INTRAVENOUS; SUBCUTANEOUS at 16:35

## 2023-02-28 RX ADMIN — ONDANSETRON 4 MG: 2 INJECTION INTRAMUSCULAR; INTRAVENOUS at 18:10

## 2023-02-28 RX ADMIN — METHYLPREDNISOLONE SODIUM SUCCINATE 80 MG: 125 INJECTION, POWDER, FOR SOLUTION INTRAMUSCULAR; INTRAVENOUS at 22:55

## 2023-02-28 RX ADMIN — SENNOSIDES 8.6 MG: 8.6 TABLET, COATED ORAL at 21:25

## 2023-02-28 RX ADMIN — GLYCOPYRROLATE 0.1 MG: 0.2 INJECTION INTRAMUSCULAR; INTRAVENOUS at 16:11

## 2023-02-28 RX ADMIN — FAMOTIDINE 20 MG: 20 TABLET ORAL at 21:25

## 2023-02-28 RX ADMIN — ROCURONIUM BROMIDE 10 MG: 10 INJECTION, SOLUTION INTRAVENOUS at 16:17

## 2023-02-28 RX ADMIN — SODIUM CHLORIDE, PRESERVATIVE FREE 10 ML: 5 INJECTION INTRAVENOUS at 21:27

## 2023-02-28 RX ADMIN — FENTANYL CITRATE 50 MCG: 50 INJECTION, SOLUTION INTRAMUSCULAR; INTRAVENOUS at 19:17

## 2023-02-28 RX ADMIN — HYDROMORPHONE HYDROCHLORIDE 0.5 MG: 2 INJECTION INTRAMUSCULAR; INTRAVENOUS; SUBCUTANEOUS at 18:25

## 2023-02-28 RX ADMIN — HYDROMORPHONE HYDROCHLORIDE 0.25 MG: 2 INJECTION INTRAMUSCULAR; INTRAVENOUS; SUBCUTANEOUS at 16:52

## 2023-02-28 RX ADMIN — HYDROMORPHONE HYDROCHLORIDE 0.5 MG: 1 INJECTION, SOLUTION INTRAMUSCULAR; INTRAVENOUS; SUBCUTANEOUS at 10:20

## 2023-02-28 RX ADMIN — SODIUM CHLORIDE: 4.5 INJECTION, SOLUTION INTRAVENOUS at 21:29

## 2023-02-28 RX ADMIN — METHYLPREDNISOLONE SODIUM SUCCINATE 80 MG: 125 INJECTION, POWDER, FOR SOLUTION INTRAMUSCULAR; INTRAVENOUS at 10:19

## 2023-02-28 RX ADMIN — FENTANYL CITRATE 50 MCG: 50 INJECTION, SOLUTION INTRAMUSCULAR; INTRAVENOUS at 16:17

## 2023-02-28 RX ADMIN — LIDOCAINE HYDROCHLORIDE 100 MG: 20 INJECTION, SOLUTION EPIDURAL; INFILTRATION; INTRACAUDAL; PERINEURAL at 16:17

## 2023-02-28 RX ADMIN — ROCURONIUM BROMIDE 40 MG: 10 INJECTION, SOLUTION INTRAVENOUS at 16:25

## 2023-02-28 RX ADMIN — SENNOSIDES 8.6 MG: 8.6 TABLET, COATED ORAL at 08:40

## 2023-02-28 RX ADMIN — DEXMEDETOMIDINE HYDROCHLORIDE 10 MCG: 100 INJECTION, SOLUTION INTRAVENOUS at 17:03

## 2023-02-28 RX ADMIN — CEFAZOLIN 2000 MG: 1 INJECTION, POWDER, FOR SOLUTION INTRAMUSCULAR; INTRAVENOUS at 23:14

## 2023-02-28 RX ADMIN — DEXMEDETOMIDINE HYDROCHLORIDE 10 MCG: 100 INJECTION, SOLUTION INTRAVENOUS at 16:32

## 2023-02-28 RX ADMIN — HYDROMORPHONE HYDROCHLORIDE 0.25 MG: 2 INJECTION INTRAMUSCULAR; INTRAVENOUS; SUBCUTANEOUS at 17:31

## 2023-02-28 RX ADMIN — SODIUM CHLORIDE, PRESERVATIVE FREE 10 ML: 5 INJECTION INTRAVENOUS at 08:44

## 2023-02-28 RX ADMIN — FENTANYL CITRATE 50 MCG: 50 INJECTION, SOLUTION INTRAMUSCULAR; INTRAVENOUS at 16:45

## 2023-02-28 RX ADMIN — DEXAMETHASONE SODIUM PHOSPHATE 10 MG: 4 INJECTION, SOLUTION INTRAMUSCULAR; INTRAVENOUS at 16:25

## 2023-02-28 RX ADMIN — DEXMEDETOMIDINE HYDROCHLORIDE 10 MCG: 100 INJECTION, SOLUTION INTRAVENOUS at 16:38

## 2023-02-28 RX ADMIN — DEXMEDETOMIDINE HYDROCHLORIDE 10 MCG: 100 INJECTION, SOLUTION INTRAVENOUS at 16:44

## 2023-02-28 RX ADMIN — CEFAZOLIN 2 G: 330 INJECTION, POWDER, FOR SOLUTION INTRAMUSCULAR; INTRAVENOUS at 16:35

## 2023-02-28 RX ADMIN — ROCURONIUM BROMIDE 10 MG: 10 INJECTION, SOLUTION INTRAVENOUS at 17:32

## 2023-02-28 RX ADMIN — HYDROMORPHONE HYDROCHLORIDE 0.5 MG: 1 INJECTION, SOLUTION INTRAMUSCULAR; INTRAVENOUS; SUBCUTANEOUS at 03:20

## 2023-02-28 RX ADMIN — Medication 100 MG: at 16:17

## 2023-02-28 RX ADMIN — ACETAMINOPHEN 650 MG: 325 TABLET ORAL at 22:55

## 2023-02-28 RX ADMIN — HYDROMORPHONE HYDROCHLORIDE 0.75 MG: 2 INJECTION INTRAMUSCULAR; INTRAVENOUS; SUBCUTANEOUS at 18:45

## 2023-02-28 RX ADMIN — SODIUM CHLORIDE, POTASSIUM CHLORIDE, SODIUM LACTATE AND CALCIUM CHLORIDE: 600; 310; 30; 20 INJECTION, SOLUTION INTRAVENOUS at 14:33

## 2023-02-28 RX ADMIN — MIDAZOLAM HYDROCHLORIDE 2 MG: 2 INJECTION, SOLUTION INTRAMUSCULAR; INTRAVENOUS at 16:11

## 2023-02-28 RX ADMIN — DEXMEDETOMIDINE HYDROCHLORIDE 10 MCG: 100 INJECTION, SOLUTION INTRAVENOUS at 16:23

## 2023-02-28 RX ADMIN — SODIUM CHLORIDE, PRESERVATIVE FREE 10 ML: 5 INJECTION INTRAVENOUS at 08:43

## 2023-02-28 ASSESSMENT — PAIN SCALES - GENERAL
PAINLEVEL_OUTOF10: 0
PAINLEVEL_OUTOF10: 6
PAINLEVEL_OUTOF10: 0
PAINLEVEL_OUTOF10: 8
PAINLEVEL_OUTOF10: 7
PAINLEVEL_OUTOF10: 8

## 2023-02-28 ASSESSMENT — PAIN DESCRIPTION - LOCATION
LOCATION: NECK
LOCATION: NECK
LOCATION: BACK
LOCATION: NECK

## 2023-02-28 ASSESSMENT — PAIN DESCRIPTION - ORIENTATION
ORIENTATION: ANTERIOR
ORIENTATION: RIGHT;MID
ORIENTATION: ANTERIOR
ORIENTATION: ANTERIOR

## 2023-02-28 ASSESSMENT — PAIN DESCRIPTION - DESCRIPTORS
DESCRIPTORS: SORE
DESCRIPTORS: SORE
DESCRIPTORS: ACHING
DESCRIPTORS: SORE

## 2023-02-28 ASSESSMENT — LIFESTYLE VARIABLES: SMOKING_STATUS: 1

## 2023-02-28 NOTE — ED NOTES
Report received from GOLDY Arroyo. Pt is asleep on bed, neck collar in place. Not in acute distress.      Douglas OrellanaMer[facility] Hospital  02/28/23 5611

## 2023-02-28 NOTE — ED NOTES
8:05 PM Assumed care of the patient at this time. Discussed with RAFIQ Chi concerning patient Elsie Stafford, standard discussion of reason for visit, HPI, ROS, PE, and current results available. Recommendation for obtaining pending MRI followed by bedside re-evaluation  and repeat consultation with Dr. Charlene Barton to dispo the pt. Corinna Gallardo PA-C     11:03 PM MRI thoracic spine completed patient cannot tolerate the MRI of the cervical spine. Compression fractures look chronic however the patient does have epidural fluid collection from C5-T2. I have consulted again with Dr. Matt Short on-call for orthopedic and spine surgery. He recommends hospital admission and will see the patient in the morning. IV steroids added. Although the patient's epidural fluid collection is likely trauma he does have an elevated sed rate and CRP. We will also plan to add broad-spectrum antibiotics and blood cultures. Hospitalist on-call Dr. Surendra Boone has agreed to accept the patient for admission at this time. Corinna Gallardo, [place]       Disposition: admitted    Corinna Gallardo PA-C      Dictation disclaimer:  Please note that this dictation was completed with [employer], the computer voice recognition software. Quite often unanticipated grammatical, syntax, homophones, and other interpretive errors are inadvertently transcribed by the computer software. Please disregard these errors. Please excuse any errors that have escaped final proofreading.        Corinna Boogie, [place]  02/27/23 7732

## 2023-02-28 NOTE — H&P
History and Physical    Patient: Grazyna Green MRN: [id number]  SSN: [id number]    YOB: 1985    Age: 40 y.o. Sex: male    No primary care provider on file. C/C : Fall     Subjective:      Grazyna Green is a 40 y.o. male with no significant PMH , admitted after having severe back and neck pains after fall from room on his back - about 11ft . Patient is giving history     According to patient he was doing fine prior to Friday ( 3 days ) ago when he fell from room , he tried to self treat himself and control pain with OTC meds and snorting Heroin. Pain worsened so he decided to come to ed to day , s/b Spine suregery and suggested MRI - C Spine ( patient could not do due to pain ) and thoracic spine. Some indication with possible local infection Vs Hematoma , ED started broad spectrum antibiotics and admitted patient . Spine surgery to follow in AM and rpt try for C spine MRI in AM .     Pain management . Past Medical History:   Diagnosis Date    Heroin addiction [facility])      Past Surgical History:   Procedure Laterality Date    HERNIA REPAIR        No family history on file. Social History     Tobacco Use    Smoking status: Every Day    Smokeless tobacco: Never   Substance Use Topics    Alcohol use: Yes     Alcohol/week: 10.0 standard drinks      Prior to Admission medications    Medication Sig Start Date End Date Taking? Authorizing Provider   naloxone [facility]) 4 MG/0.1ML LIQD nasal spray Use 1 spray intranasally, then discard. Repeat with new spray every 2 min as needed for opioid overdose symptoms, alternating nostrils. 12/23/21   Ar Automatic Reconciliation        No Known Allergies    Review of Systems:  positive responses in bold type   Constitutional: Negative for fever, chills, diaphoresis and unexpected weight change. HENT: neck pain - in hard Collar   Eyes: Negative for photophobia, pain, redness and visual disturbance.    Respiratory: negative for shortness of breath, cough, choking, chest tightness, wheezing or stridor. Cardiovascular: Negative for chest pain, palpitations and leg swelling. Gastrointestinal: Negative for nausea, vomiting, abdominal pain, diarrhea, constipation, blood in stool, abdominal distention and anal bleeding. Genitourinary: Negative for dysuria, urgency, frequency, hematuria, flank pain and difficulty urinating. Musculoskeletal: severe neck pains and back pains   Skin: Negative for color change, rash and wound. Neurological: Negative for dizziness, seizures, syncope, speech difficulty, light-headedness or headaches. Hematological: Does not bruise/bleed easily. Psychiatric/Behavioral: Negative for suicidal ideas, hallucinations, behavioral problems, self-injury or agitation          Objective: Body mass index is 21.3 kg/m².   Vitals:    02/27/23 1830 02/27/23 1900 02/27/23 2253 02/27/23 2310   BP: 114/80 106/78  114/83   Pulse:       Resp:       Temp:       TempSrc:       SpO2: 99% 96%  97%   Weight:   175 lb (79.4 kg)    Height:            Physical Exam:  General appearance - alert, well appearing, and in no distress and oriented to person, place, and time  Mental status - alert, oriented to person, place, and time, normal mood, behavior, speech, dress, motor activity, and thought processes  Neck - in Hard collar   Chest - clear to auscultation, no wheezes, rales or rhonchi, symmetric air entry  Heart - normal rate, regular rhythm, normal S1, S2, no murmurs, rubs, clicks or gallops  Abdomen - soft, nontender, nondistended, no masses or organomegaly  Back exam - Limited exam no obvious open injury   Neurological - alert, oriented, normal speech, decrease ROM left shoulder , Left UL , painful ROM   Musculoskeletal - No Clinical evidence of acute bone fracture of bones of limbs              CBC:  Lab Results   Component Value Date/Time    WBC 6.8 02/27/2023 09:40 AM    HGB 13.6 02/27/2023 09:40 AM    HCT 42.0 02/27/2023 09:40 AM     02/27/2023 09:40 AM    MCV 92.1 02/27/2023 09:40 AM        CMP:  Lab Results   Component Value Date/Time     02/27/2023 09:40 AM    K 3.8 02/27/2023 09:40 AM    CL 99 02/27/2023 09:40 AM    CO2 25 02/27/2023 09:40 AM    BUN 8 02/27/2023 09:40 AM    GFRAA >60 07/18/2021 03:37 PM    GLOB 3.6 07/18/2021 03:37 PM    ALT 42 07/18/2021 03:37 PM        PT/INR  Lab Results   Component Value Date/Time    INR 1.0 02/27/2023 09:40 AM                 Assessment and  Plan:     1 Fall   2 C spine - injury /   3 Illicit drug use - Denies IVDA     PLAN   - Hard collar   - C spine MRI in AM   - Pain management   - Currently on broad spectrum antibiotics - I will hold till seen by Ortho - Please restart if Ortho / Spine recommends - Continue steroids     Signed By: Miguelito Alberts MD     February 27, 2023

## 2023-02-28 NOTE — PERIOP NOTE
TRANSFER - IN REPORT:    Verbal report received from [facility] [employer]  on Shalini He  being received from [facility] for ordered procedure      Report consisted of patient's Situation, Background, Assessment and   Recommendations(SBAR). Information from the following report(s) Nurse Handoff Report was reviewed with the receiving nurse. Opportunity for questions and clarification was provided. Assessment completed upon patient's arrival to unit and care assumed.

## 2023-02-28 NOTE — PROGRESS NOTES
2/27/23 @ 2038 Patient completed 1 out of 2 exam. Patient states he has severe back pain and refused to complete MRI Cervical spine exam. RN notified of patient status.

## 2023-02-28 NOTE — BRIEF OP NOTE
Brief Postoperative Note      Patient: Forde Prader  YOB: 1985  MRN: [id number]    Date of Procedure: 2/28/2023    Pre-Op Diagnosis: fracture dislocation C4/5, C5/6    Post-Op Diagnosis: Same       Procedure(s):  CERVICAL DISCECTOMY FUSION ANTERIOR ONE LEVEL CERVICAL FOUR/FIVE, FIVE/SIX; C ARM, JUAN    Surgeon(s):  Mary Nicole MD    Assistant:  Surgical Assistant: Vincent Hong    Anesthesia: General    Estimated Blood Loss (mL): Minimal    Complications: None    Specimens:   * No specimens in log *    Implants:  Implant Name Type Inv.  Item Serial No.  Lot No. LRB No. Used Action   ALLOGRAFT BNE SPACER 7 DEG 14.5X11.5X7 MM CERV CORTICAL CANC - [id number]  ALLOGRAFT BNE SPACER 7 DEG 14.5X11.5X7 MM CERV CORTICAL CANC  [employer] HOWM-WD N/A N/A 1 Implanted   ALLOGRAFT BNE SPACER 7 DEG 14.5X11.5X7 MM CERV CORTICAL CANC - [id number]  ALLOGRAFT BNE SPACER 7 DEG 14.5X11.5X7 MM CERV CORTICAL CANC  [employer] HOWM-WD N/A N/A 1 Implanted   PLATE ANT CERV CONSTRND 2 LVL 40MM OZARK VIEW - [id number]  PLATE ANT CERV CONSTRND 2 LVL 40MM [employer]ZARK VIEW  [employer]WD N/A N/A 1 Implanted   SCREW SPNL L16MM DIA4MM SELF STARTING MEME ANT CERV TI OZARK - [id number]  SCREW SPNL L16MM DIA4MM SELF STARTING MEME ANT CERV TI OZARK  [employer]-WD N/A N/A 6 Implanted         Drains: * No LDAs found *    Findings: consistent with acute on chronic process, PLL disrupted, segments subluxed, C5 fractured, chronic inflammatory changes retro esophageal space    Electronically signed by Debbie Rodney MD on 2/28/2023 at 6:06 PM

## 2023-02-28 NOTE — ED NOTES
Pt remains resting in bed w/ NAD.  Complains of pain when he wakes     [place] [employer], GOLDY  02/28/23 7848

## 2023-02-28 NOTE — PROGRESS NOTES
OT orders received and medical chart review completed. Pt reports he is at baseline as independent  with ADLs and functional mobility w/o AD. Formal OT evaluation not indicated at this time. OT to sign off.

## 2023-02-28 NOTE — CONSULTS
Contacted about patient this afternoon. Reviewed history and studies. Recommended MRI C and T spine with hard collar . Suspected ligamentous injury to C spine. Patient just had T spine study- refused C spine study secondary to pain. Concern for cervical injury high. Would recommend admission , pain control and maintenance of hard collar until MRI completed.

## 2023-02-28 NOTE — PROGRESS NOTES
completed the initial Spiritual Assessment of the patient, and offered Pastoral Care support to the patient in room 7 of the emergency room  where he will be admitted to the hospital from for surgery. There is no advance directive present. Patient does not have any Synagogue/cultural needs that will affect patients preferences in health care. Chaplains will continue to follow and will provide pastoral care on an as needed/requested basis.     Marnori [facility] Care Department  [phone]

## 2023-02-28 NOTE — CONSULTS
Consult    Patient: Romel Muse MRN: [id number]  SSN: [id number]    YOB: 1985  Age: 40 y.o. Sex: male      Subjective:      Romel Muse is a 40 y.o. male who is being seen for neck pain. 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 He has had severe neck pain with radiating left arm pain. Presented to the emergency room. Patient is complaining of neck pain arm pain initial CAT scan was reviewed it demonstrates what appears to be probable ligament disruption in the low cervical spine with some compressive deformity anteriorly. Chronic compression deformities were noted in the thoracic spine. MRI was ordered MRI thoracic spine showed no acute abnormality MRI of the thoracic cervical spine was not done because of patient's complaints of pain. On physical exam patient is in a hard collar as ordered. He has normal strength in his right upper extremity deltoids biceps triceps intrinsics severe pain in his left arm seems to be low cervical C7 C6. Down the posterior aspect of his left arm he is weak in his triceps somewhat weak in his intrinsics mildly weak in his biceps. A lot of pain inhibition no long track signs no clonus Green's of Babinski. No evidence of bowel or bladder dysfunction. Past Medical History:   Diagnosis Date    Heroin addiction [facility])      Past Surgical History:   Procedure Laterality Date    HERNIA REPAIR        No family history on file. Social History     Tobacco Use    Smoking status: Every Day    Smokeless tobacco: Never   Substance Use Topics    Alcohol use:  Yes     Alcohol/week: 10.0 standard drinks      Current Facility-Administered Medications   Medication Dose Route Frequency Provider Last Rate Last Admin    HYDROmorphone HCl PF (DILAUDID) injection 0.5 mg  0.5 mg IntraVENous Q3H PRN Lyssa Dugna MD   0.5 mg at 02/28/23 0320    ketorolac (TORADOL) injection 15 mg  15 mg IntraVENous Q6H PRN Lyssa Dugan MD        [Held by provider] vancomycin (VANCOCIN) 2000 mg in 0.9% sodium chloride 500 mL IVPB  2,000 mg IntraVENous Once April HILDA Gallardo PA-C        [Held by provider] vancomycin (VANCOCIN) 1500 mg in sodium chloride 0.9% 500 mL IVPB  1,500 mg IntraVENous Q12H April HILDA Gallardo PA-C        sodium chloride flush 0.9 % injection 5-40 mL  5-40 mL IntraVENous 2 times per day Alfonzo Lay MD        sodium chloride flush 0.9 % injection 5-40 mL  5-40 mL IntraVENous PRN Alfonzo Lay MD        0.9 % sodium chloride infusion   IntraVENous PRN Alfonzo Lay MD        enoxaparin (LOVENOX) injection 40 mg  40 mg SubCUTAneous Daily Alfonzo Lay MD        ondansetron (ZOFRAN-ODT) disintegrating tablet 4 mg  4 mg Oral Q8H PRN Alfonzo Lay MD        Or    ondansetron (ZOFRAN) injection 4 mg  4 mg IntraVENous Q6H PRN Alfonzo Lay MD        acetaminophen (TYLENOL) tablet 650 mg  650 mg Oral Q6H PRN Alfonzo Lay MD        Or    acetaminophen (TYLENOL) suppository 650 mg  650 mg Rectal Q6H PRN Alfonzo Lay MD        senna (SENOKOT) tablet 8.6 mg  1 tablet Oral BID Alfonzo Lay MD        famotidine (PEPCID) tablet 20 mg  20 mg Oral BID Alfonzo Lay MD        methylPREDNISolone sodium (SOLU-MEDROL) injection 80 mg  80 mg IntraVENous Q6H Alfonzo Lay MD   80 mg at 02/28/23 0615    [Held by provider] HYDROmorphone (DILAUDID) 1 mg/mL PCA   IntraVENous Continuous Alfonzo Lay MD         Current Outpatient Medications   Medication Sig Dispense Refill    naloxone (NARCAN) 4 MG/0.1ML LIQD nasal spray Use 1 spray intranasally, then discard. Repeat with new spray every 2 min as needed for opioid overdose symptoms, alternating nostrils. No Known Allergies    Review of Systems:  Pertinent items are noted in the History of Present Illness.     Objective:     Vitals:    02/27/23 2330 02/28/23 0000 02/28/23 0030 02/28/23 0100   BP: 110/77 99/67 103/69 110/75   Pulse:       Resp:       Temp:       TempSrc:       SpO2: Weight:       Height:            Physical Exam:  Patient exhibiting low cervical radiculopathy with pain inhibition and weakness on the left. Neck pain. Weakness triceps biceps intrinsics on the left. No clonus Green's or Babinski. Normal reflexes patella and Achilles. Right upper extremity normal.      Likely ligamentous injury to his cervical spine with prevertebral edema. MRI required to see if there is cord or root compression and to better analyze the nature of the patient's ligamentous injury. Assessment:         Patient took a fall from the roof. His traumatic injury to his cervical spine. Likely ligamentous disruption probably injury to disc or other structures causing a left-sided low cervical radiculopathy. Plan:     MRI of the cervical spine. Reevaluation for possible surgical intervention. Use of hard collar. Please contact me when MRI of C-spine is done so I can review it.     Signed By: Raejean Barthel, MD     February 28, 2023

## 2023-02-28 NOTE — ANESTHESIA PRE PROCEDURE
Department of Anesthesiology  Preprocedure Note       Name:  Gerald Luciano   Age:  40 y.o.  :  1985                                          MRN:  [id number]         Date:  2023      Surgeon: Reanna Whittaker):  Nguyễn Clark MD    Procedure: Procedure(s):  CERVICAL DISCECTOMY FUSION ANTERIOR ONE LEVEL CERVICAL FOUR/FIVE, FIVE/SIX; C ARM, JUAN    Medications prior to admission:   Prior to Admission medications    Medication Sig Start Date End Date Taking? Authorizing Provider   naloxone [facility]) 4 MG/0.1ML LIQD nasal spray Use 1 spray intranasally, then discard. Repeat with new spray every 2 min as needed for opioid overdose symptoms, alternating nostrils.  21   Ar Automatic Reconciliation       Current medications:    Current Facility-Administered Medications   Medication Dose Route Frequency Provider Last Rate Last Admin    HYDROmorphone HCl PF (DILAUDID) injection 0.5 mg  0.5 mg IntraVENous Q3H PRN John Gaffney MD   0.5 mg at 23 1352    HYDROmorphone (DILAUDID) tablet 1 mg  1 mg Oral Q4H PRN Nguyễn Clark MD        lactated ringers IV soln infusion   IntraVENous Continuous Manuel Waite MD       [facility]] vancomycin (VANCOCIN) 2000 mg in 0.9% sodium chloride 500 mL IVPB  2,000 mg IntraVENous Once April HILDA Gallardo PA-C        [Held by provider] vancomycin (VANCOCIN) 1500 mg in sodium chloride 0.9% 500 mL IVPB  1,500 mg IntraVENous Q12H April HILDA Gallardo PA-C        sodium chloride flush 0.9 % injection 5-40 mL  5-40 mL IntraVENous 2 times per day John Gaffney MD   10 mL at 23 0844    sodium chloride flush 0.9 % injection 5-40 mL  5-40 mL IntraVENous PRN John Gaffney MD   10 mL at 23 0844    0.9 % sodium chloride infusion   IntraVENous PRN John Gaffney MD        enoxaparin (LOVENOX) injection 40 mg  40 mg SubCUTAneous Daily John Gaffney MD   40 mg at 23 0840    ondansetron (ZOFRAN-ODT) disintegrating tablet 4 mg  4 mg Oral Q8H PRN Casey Brianna Fonseca MD        Or    ondansetron [facility]) injection 4 mg  4 mg IntraVENous Q6H PRN Bharath Alonso MD        acetaminophen (TYLENOL) tablet 650 mg  650 mg Oral Q6H PRN Bharath Alonso MD        Or   [facility] acetaminophen (TYLENOL) suppository 650 mg  650 mg Rectal Q6H PRN Bharath Alonso MD        senna (SENOKOT) tablet 8.6 mg  1 tablet Oral BID Bharath Alonso MD   8.6 mg at 02/28/23 0840    famotidine (PEPCID) tablet 20 mg  20 mg Oral BID Bharath Alonso MD   20 mg at 02/28/23 0840    methylPREDNISolone sodium (SOLU-MEDROL) injection 80 mg  80 mg IntraVENous Q6H Bharath Alonso MD   80 mg at 02/28/23 1019    [Held by provider] HYDROmorphone (DILAUDID) 1 mg/mL PCA   IntraVENous Continuous Bharath Alonso MD   Held at 02/28/23 0300       Allergies:  No Known Allergies    Problem List:    Patient Active Problem List   Diagnosis Code    Acute traumatic injury of cervical spine ([facility] [address] S14.109A    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 4XAA    Injury to ligament of cervical spine S13. 4XXA       Past Medical History:        Diagnosis Date    Heroin addiction [facility])        Past Surgical History:        Procedure Laterality Date    HERNIA REPAIR         Social History:    Social History     Tobacco Use    Smoking status: Every Day    Smokeless tobacco: Never   Substance Use Topics    Alcohol use:  Yes     Alcohol/week: 10.0 standard drinks                                Ready to quit: Not Answered  Counseling given: Not Answered      Vital Signs (Current):   Vitals:    02/28/23 0800 02/28/23 0815 02/28/23 0830 02/28/23 1014   BP: 103/73   115/71   Pulse: 99   86   Resp: 20   19   Temp: 97.8 °F (36.6 °C)   97 °F (36.1 °C)   TempSrc: Oral   Oral   SpO2: 94% 93% 95% 97%   Weight:       Height:                                                  BP Readings from Last 3 Encounters:   02/28/23 115/71       NPO Status: Time of last liquid consumption: 0700                        Time of last solid consumption: 0700                        Date of last liquid consumption: 02/28/23                        Date of last solid food consumption: 02/27/23    BMI:   Wt Readings from Last 3 Encounters:   02/27/23 175 lb (79.4 kg)   02/27/23 175 lb (79.4 kg)     Body mass index is 21.3 kg/m². CBC:   Lab Results   Component Value Date/Time    WBC 5.4 02/28/2023 06:20 AM    RBC 3.69 02/28/2023 06:20 AM    HGB 11.0 02/28/2023 06:20 AM    HCT 33.8 02/28/2023 06:20 AM    MCV 91.6 02/28/2023 06:20 AM    RDW 13.2 02/28/2023 06:20 AM     02/28/2023 06:20 AM       CMP:   Lab Results   Component Value Date/Time     02/28/2023 06:20 AM    K 4.7 02/28/2023 06:20 AM     02/28/2023 06:20 AM    CO2 26 02/28/2023 06:20 AM    BUN 11 02/28/2023 06:20 AM    CREATININE 0.63 02/28/2023 06:20 AM    GFRAA >60 07/18/2021 03:37 PM    AGRATIO 0.9 07/18/2021 03:37 PM    LABGLOM >60 02/28/2023 06:20 AM    GLUCOSE 136 02/28/2023 06:20 AM    PROT 7.2 02/28/2023 06:20 AM    CALCIUM 8.9 02/28/2023 06:20 AM    BILITOT 0.4 02/28/2023 06:20 AM    ALKPHOS 112 02/28/2023 06:20 AM    ALKPHOS 88 07/18/2021 03:37 PM    AST 24 02/28/2023 06:20 AM    ALT 89 02/28/2023 06:20 AM       POC Tests: No results for input(s): POCGLU, POCNA, POCK, POCCL, POCBUN, POCHEMO, POCHCT in the last 72 hours.     Coags:   Lab Results   Component Value Date/Time    PROTIME 13.7 02/27/2023 09:40 AM    INR 1.0 02/27/2023 09:40 AM    APTT 30.4 02/28/2023 10:36 AM       HCG (If Applicable): No results found for: PREGTESTUR, PREGSERUM, HCG, HCGQUANT     ABGs: No results found for: PHART, PO2ART, LUR1QQH, XZS6DUQ, BEART, R2GFHLNC     Type & Screen (If Applicable):  No results found for: LABABO, LABRH    Drug/Infectious Status (If Applicable):  No results found for: HIV, HEPCAB    COVID-19 Screening (If Applicable):   Lab Results   Component Value Date/Time    COVID19 Not detected 02/28/2023 01:00 PM           Anesthesia Evaluation  Patient summary reviewed and Nursing notes reviewed  Airway: Mallampati: II  TM distance: >3 FB   Neck ROM: full  Mouth opening: > = 3 FB  C-spine cleared Dental:    (+) poor dentition      Pulmonary:normal exam    (+) current smoker          Patient smoked on day of surgery. ROS comment: Apparently uses heroin frequently. Also marijuana daily. Cardiovascular:Negative CV ROS            Rhythm: regular  Rate: normal                    Neuro/Psych:               GI/Hepatic/Renal: Neg GI/Hepatic/Renal ROS            Endo/Other: Negative Endo/Other ROS               C-spine cleared           Abdominal:             Vascular: Other Findings:           Anesthesia Plan      general     ASA 3     (Will use glidescope )  Induction: intravenous. Anesthetic plan and risks discussed with patient.                         Severa Moats, MD   2/28/2023

## 2023-02-28 NOTE — ED NOTES
Report given to Khai Ball of [facility]N. Report consisted of SBAR, ED summary, MAR an recent results.      Bashir Dora, [address] [facility]  02/28/23 2251

## 2023-02-28 NOTE — CONSULTS
Consult    Patient: Kelly Mcadams MRN: [id number]  SSN: [id number]    YOB: 1985  Age: 40 y.o. Sex: male      Subjective:      Kelly Mcadams is a 40 y.o. male referred by the ED provider for left shoulder pain s/p fall. Mr. Merlin Butter fell backwards off a one-story building Friday, February 24, 2023. He was transported to [facility] [address] where left shoulder x rays were negative for fracture. Thoracic MRI scan revealed superior endplate compression deformities T4, T5 & T9 along with evidence of a paraspinal hematoma C6 through T1. He is still experiencing cervical and left shoulder pain. Past Medical History:   Diagnosis Date    Heroin addiction [facility])      Past Surgical History:   Procedure Laterality Date    HERNIA REPAIR        No family history on file. Social History     Tobacco Use    Smoking status: Every Day    Smokeless tobacco: Never   Substance Use Topics    Alcohol use:  Yes     Alcohol/week: 10.0 standard drinks      Current Facility-Administered Medications   Medication Dose Route Frequency Provider Last Rate Last Admin    HYDROmorphone HCl PF (DILAUDID) injection 0.5 mg  0.5 mg IntraVENous Q3H PRN Odilon Ramirez MD   0.5 mg at 02/28/23 1020    HYDROmorphone (DILAUDID) tablet 1 mg  1 mg Oral Q4H PRN Soren Ramos MD        [Held by provider] vancomycin (VANCOCIN) 2000 mg in 0.9% sodium chloride 500 mL IVPB  2,000 mg IntraVENous Once April HILDA Gallardo PA-C        [Held by provider] vancomycin (VANCOCIN) 1500 mg in sodium chloride 0.9% 500 mL IVPB  1,500 mg IntraVENous Q12H April HILDA Gallardo PA-C        sodium chloride flush 0.9 % injection 5-40 mL  5-40 mL IntraVENous 2 times per day Odilon Ramirez MD   10 mL at 02/28/23 0844    sodium chloride flush 0.9 % injection 5-40 mL  5-40 mL IntraVENous PRN Odilon Ramirez MD   10 mL at 02/28/23 0844    0.9 % sodium chloride infusion   IntraVENous PRN Odilon Ramirez MD        enoxaparin (LOVENOX) injection 40 mg  40 mg SubCUTAneous Daily Miguelito Alberts MD   40 mg at 02/28/23 0840    ondansetron (ZOFRAN-ODT) disintegrating tablet 4 mg  4 mg Oral Q8H PRN Miguelito Alberts MD        Or    [facility]) injection 4 mg  4 mg IntraVENous Q6H PRN Miguelito Alberts MD        acetaminophen (TYLENOL) tablet 650 mg  650 mg Oral Q6H PRN Miguelito Ablerts MD        Or    acetaminophen (TYLENOL) suppository 650 mg  650 mg Rectal Q6H PRN Miguelito Alberts MD        senna (SENOKOT) tablet 8.6 mg  1 tablet Oral BID Miguelito Alberts MD   8.6 mg at 02/28/23 0840    famotidine (PEPCID) tablet 20 mg  20 mg Oral BID Miguelito Alberts MD   20 mg at 02/28/23 0840    methylPREDNISolone sodium (SOLU-MEDROL) injection 80 mg  80 mg IntraVENous Q6H Miguelito Alberts MD   80 mg at 02/28/23 1019    [Held by provider] HYDROmorphone (DILAUDID) 1 mg/mL PCA   IntraVENous Continuous Miguelito Alberts MD   Held at 02/28/23 0300        No Known Allergies    Objective:     Vitals:    02/28/23 0800 02/28/23 0815 02/28/23 0830 02/28/23 1014   BP: 103/73   115/71   Pulse: 99   86   Resp: 20   19   Temp: 97.8 °F (36.6 °C)   97 °F (36.1 °C)   TempSrc: Oral   Oral   SpO2: 94% 93% 95% 97%   Weight:       Height:            Physical Exam:  Examination Right shoulder Left shoulder   Skin Intact Intact   Effusion - -   Biceps deformity - -   Atrophy - -   AC joint tenderness - +   Acromial tenderness - +   Biceps tenderness - -   Forward flexion/Elevation  150   Active abduction  150   External rotation ROM 30 20   Internal rotation ROM 90 70   Apprehension - -   Impingement - -   Drop Arm Test - -   Neurovascular Intact Intact   Painful and limited left shoulder motion    X ray:  CT HEAD WO CONTRAST    Result Date: 2/27/2023  No acute intracranial abnormalities. CT CERVICAL SPINE WO CONTRAST    Result Date: 2/27/2023  EXAM:  CT Cervical Spine without Contrast CLINICAL INDICATION:  Fall from 1 story building. COMPARISON:  CT facial structures 05/16/19.                  1.  No acute fracture or subluxation. 2.  Abnormal appearance of the prevertebral soft tissue, with pronounced prevertebral soft tissue thickening and abnormal fluid collections. Potentially suggestive of cervical discitis centered at C4-5 and C5-6. Recommend MR for further delineation. MRI THORACIC SPINE WO CONTRAST    Result Date: 2/28/2023  EXAM: MRI THORACIC SPINE WO CONTRAST CLINICAL INDICATION/HISTORY: back pain after fall, T5 and T9 with subtle anterior wedging concern for compression fracture, seen on CT. status post fall from one story building on Friday. Vertebral body heights: Low-grade compression deformity is at the T4, T5, and T9 superior endplates without associated marrow edema suggesting these likely reflect chronic compression deformities. Multiple Schmorl's nodes are also present. No acute thoracic vertebral body fracture detected. Marrow signal: Diffusely decreased T1 signal throughout the marrow without associated STIR hyperintensity. Mild fatty and edema like endplate signal at L80-38, likely degenerative. No additional and edema like signal within the thoracic spine. Spinal canal spinal canal: Ventral epidural T1 mildly hyperintense, T2 hyperintense collection extending from the lower cervical spine right superior to the field-of-view extending to the T2-3 level, measuring up to 5 mm in thickness. There is also significant prevertebral edema and fluid within the lower cervical spine through superior thoracic spine, which extend superior to the field-of-view. No definite cord signal abnormality appreciated within the thoracic spine. Mass effect on the ventral cord at the C6 through C7-T1 levels and to lesser extent at the T1 and T2 levels. No thoracic cord signal normality appreciated allowing for limitations due to motion artifact.  Soft tissues: T2 hyperintense tubular structures within the bilateral T9 and T9-10 intercostal spaces, which are nonspecific but may reflect dilated vessels or perineural cysts. Spinal canal and neural foramina: Mild lower lumbar facet arthritis. Tiny disc herniations at T4-5 and C5-6. Moderate to severe spinal canal stenosis at C6-7 through mid C7 level secondary to the ventral epidural collection. Mild to moderate spinal canal narrowing is present at the superior T1-T2 through T2-3 levels secondary to ventral epidural collection. No additional significant spinal canal narrowing. No significant thoracic foraminal narrowing appreciated. Please refer to the pending MRI cervical spine report for details regarding cervical spine foraminal narrowing. Other: Kyphosis of the thoracic spine with prevertebral and epidural edema extending to the superior thoracic spine. Chronic appearing low-grade compression deformities of the T4, T5, T9 superior endplate as well as multiple thoracic spine Schmorl's nodes. No evidence of acute thoracic vertebral fracture. Partially imaged ventral epidural collection extending from the cervical spine to the superior thoracic spine at the T2-3 level and partially imaged ventral epidural edema and poorly defined fluid collections. Mass effect on the ventral cord at the visualized C6-T2 levels with moderate to severe spinal canal stenosis at C6-7 through C7-T1 and mild to moderate spinal canal narrowing at T1-2 through T2-3. No thoracic cord edema detected. -Given the history of trauma these are favored to reflect epidural and paraspinal hematomas although as discussed on the CT report abscesses or phlegmon are also possible. Recommend clinical correlation and correlation with inflammatory laboratory markers. Diffuse marrow signal abnormality as discussed above, most commonly sequela of hematopoietic marrow reconversion (which can be seen with chronic anemia, cigarette smoking, and obesity among other etiologies). Less commonly this can be seen with marrow replacing processes such as myeloma or lymphoma. Suggest correlation with CBC and SPEP/UPEP. A preliminary report provided by Dr. Crawford on 2/27/23 at 21:59. Reading discussed with ER PA April.    XR SHOULDER LEFT (MIN 2 VIEWS)    Result Date: 2/27/2023  Minimal degenerative changes of the glenohumeral and acromioclavicular joints. Several nonspecific calcifications project over the left neck.     No acute fracture detected.  I independently reviewed these images today.  CT CHEST ABDOMEN PELVIS W CONTRAST Additional Contrast? None    Result Date: 2/27/2023       1.  T5 and T9 with subtle anterior wedging potentially suggestive of compression deformities.  MR can be utilized to confirm compression fractures. 2.  Small air pockets in the right shoulder adjacent to the humeral head. Source for the air collections is unclear.  Query penetrating wound to the right shoulder. 3.  No acute abnormalities elsewhere.       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 Tang.  No need for ortho shoulder surgical intervention at this time.  When able to he will begin physical therapy to the left shoulder.  We will consider ordering a L shoulder MRI scan if pain continues despite conservative care.    Signed By: Demetrius Zavala MD     February 28, 2023

## 2023-02-28 NOTE — PROGRESS NOTES
Physical Therapy  PT order received and chart reviewed. Patient confirms he is independent with mobility at this time. Awaiting cervical procedure. Will sign off.      Thank you for this referral.   Sanjeev Cespedes PT, DPT

## 2023-02-28 NOTE — ED NOTES
MRI called bc pt refused to finish scans and walked out of scan room bc he said he was in too much pain     Dina Patel RN  02/27/23 2033

## 2023-02-28 NOTE — PROGRESS NOTES
Reviewed MRI. Demonstrates epidural hematoma, ligamentous disruption. C4/5 C5/6. Patient with severe left arm pain and weakness. I believe patient will be best served with acdf C4/5/6 decompressing left sideed roots an stabilizing segments. Have put on emergency schedule.     Discussed with hospitalist

## 2023-03-01 ENCOUNTER — APPOINTMENT (OUTPATIENT)
Facility: HOSPITAL | Age: 38
DRG: 912 | End: 2023-03-01
Payer: MEDICAID

## 2023-03-01 LAB
ANION GAP SERPL CALC-SCNC: 3 MMOL/L (ref 3–18)
BUN SERPL-MCNC: 18 MG/DL (ref 7–18)
BUN/CREAT SERPL: 33 (ref 12–20)
CALCIUM SERPL-MCNC: 8.6 MG/DL (ref 8.5–10.1)
CHLORIDE SERPL-SCNC: 104 MMOL/L (ref 100–111)
CO2 SERPL-SCNC: 27 MMOL/L (ref 21–32)
CREAT SERPL-MCNC: 0.55 MG/DL (ref 0.6–1.3)
ERYTHROCYTE [DISTWIDTH] IN BLOOD BY AUTOMATED COUNT: 12.9 % (ref 11.6–14.5)
ERYTHROCYTE [DISTWIDTH] IN BLOOD BY AUTOMATED COUNT: 13 % (ref 11.6–14.5)
GLUCOSE SERPL-MCNC: 141 MG/DL (ref 74–99)
HCT VFR BLD AUTO: 32.2 % (ref 36–48)
HCT VFR BLD AUTO: 34.4 % (ref 36–48)
HGB BLD-MCNC: 10.1 G/DL (ref 13–16)
HGB BLD-MCNC: 11.2 G/DL (ref 13–16)
MCH RBC QN AUTO: 30.3 PG (ref 24–34)
MCH RBC QN AUTO: 30.4 PG (ref 24–34)
MCHC RBC AUTO-ENTMCNC: 31.4 G/DL (ref 31–37)
MCHC RBC AUTO-ENTMCNC: 32.6 G/DL (ref 31–37)
MCV RBC AUTO: 93.5 FL (ref 78–100)
MCV RBC AUTO: 96.7 FL (ref 78–100)
NRBC # BLD: 0 K/UL (ref 0–0.01)
NRBC # BLD: 0 K/UL (ref 0–0.01)
NRBC BLD-RTO: 0 PER 100 WBC
NRBC BLD-RTO: 0 PER 100 WBC
PLATELET # BLD AUTO: 458 K/UL (ref 135–420)
PLATELET # BLD AUTO: 510 K/UL (ref 135–420)
PMV BLD AUTO: 8.5 FL (ref 9.2–11.8)
PMV BLD AUTO: 8.7 FL (ref 9.2–11.8)
POTASSIUM SERPL-SCNC: 4.1 MMOL/L (ref 3.5–5.5)
RBC # BLD AUTO: 3.33 M/UL (ref 4.35–5.65)
RBC # BLD AUTO: 3.68 M/UL (ref 4.35–5.65)
SODIUM SERPL-SCNC: 134 MMOL/L (ref 136–145)
WBC # BLD AUTO: 16.3 K/UL (ref 4.6–13.2)
WBC # BLD AUTO: 17.2 K/UL (ref 4.6–13.2)

## 2023-03-01 PROCEDURE — 96372 THER/PROPH/DIAG INJ SC/IM: CPT

## 2023-03-01 PROCEDURE — 99232 SBSQ HOSP IP/OBS MODERATE 35: CPT | Performed by: SPECIALIST

## 2023-03-01 PROCEDURE — 2500000003 HC RX 250 WO HCPCS: Performed by: ORTHOPAEDIC SURGERY

## 2023-03-01 PROCEDURE — 92526 ORAL FUNCTION THERAPY: CPT

## 2023-03-01 PROCEDURE — 6360000002 HC RX W HCPCS: Performed by: ORTHOPAEDIC SURGERY

## 2023-03-01 PROCEDURE — 6370000000 HC RX 637 (ALT 250 FOR IP): Performed by: ORTHOPAEDIC SURGERY

## 2023-03-01 PROCEDURE — 2580000003 HC RX 258: Performed by: ORTHOPAEDIC SURGERY

## 2023-03-01 PROCEDURE — 96376 TX/PRO/DX INJ SAME DRUG ADON: CPT

## 2023-03-01 PROCEDURE — 96375 TX/PRO/DX INJ NEW DRUG ADDON: CPT

## 2023-03-01 PROCEDURE — 6360000002 HC RX W HCPCS: Performed by: INTERNAL MEDICINE

## 2023-03-01 PROCEDURE — 80048 BASIC METABOLIC PNL TOTAL CA: CPT

## 2023-03-01 PROCEDURE — 1100000000 HC RM PRIVATE

## 2023-03-01 PROCEDURE — 72125 CT NECK SPINE W/O DYE: CPT

## 2023-03-01 PROCEDURE — A4216 STERILE WATER/SALINE, 10 ML: HCPCS | Performed by: ORTHOPAEDIC SURGERY

## 2023-03-01 PROCEDURE — 36415 COLL VENOUS BLD VENIPUNCTURE: CPT

## 2023-03-01 PROCEDURE — 97166 OT EVAL MOD COMPLEX 45 MIN: CPT

## 2023-03-01 PROCEDURE — 92610 EVALUATE SWALLOWING FUNCTION: CPT

## 2023-03-01 PROCEDURE — 85027 COMPLETE CBC AUTOMATED: CPT

## 2023-03-01 RX ORDER — HYDROMORPHONE HYDROCHLORIDE 1 MG/ML
1 INJECTION, SOLUTION INTRAMUSCULAR; INTRAVENOUS; SUBCUTANEOUS
Status: DISCONTINUED | OUTPATIENT
Start: 2023-03-01 | End: 2023-03-02 | Stop reason: HOSPADM

## 2023-03-01 RX ADMIN — OXYCODONE HYDROCHLORIDE 5 MG: 5 TABLET ORAL at 22:16

## 2023-03-01 RX ADMIN — HYDROMORPHONE HYDROCHLORIDE 0.5 MG: 1 INJECTION, SOLUTION INTRAMUSCULAR; INTRAVENOUS; SUBCUTANEOUS at 09:28

## 2023-03-01 RX ADMIN — FAMOTIDINE 20 MG: 20 TABLET ORAL at 20:21

## 2023-03-01 RX ADMIN — ACETAMINOPHEN 650 MG: 325 TABLET ORAL at 20:21

## 2023-03-01 RX ADMIN — HYDROMORPHONE HYDROCHLORIDE 1 MG: 2 TABLET ORAL at 12:34

## 2023-03-01 RX ADMIN — SODIUM CHLORIDE: 4.5 INJECTION, SOLUTION INTRAVENOUS at 09:37

## 2023-03-01 RX ADMIN — BISACODYL 5 MG: 5 TABLET, COATED ORAL at 09:35

## 2023-03-01 RX ADMIN — CEFAZOLIN 2000 MG: 1 INJECTION, POWDER, FOR SOLUTION INTRAMUSCULAR; INTRAVENOUS at 09:32

## 2023-03-01 RX ADMIN — ACETAMINOPHEN 650 MG: 325 TABLET ORAL at 09:42

## 2023-03-01 RX ADMIN — ACETAMINOPHEN 650 MG: 325 TABLET ORAL at 16:47

## 2023-03-01 RX ADMIN — HYDROMORPHONE HYDROCHLORIDE 1 MG: 1 INJECTION, SOLUTION INTRAMUSCULAR; INTRAVENOUS; SUBCUTANEOUS at 20:22

## 2023-03-01 RX ADMIN — SENNOSIDES 8.6 MG: 8.6 TABLET, COATED ORAL at 20:21

## 2023-03-01 RX ADMIN — HYDROMORPHONE HYDROCHLORIDE 1 MG: 2 TABLET ORAL at 07:58

## 2023-03-01 RX ADMIN — ACETAMINOPHEN 650 MG: 325 TABLET ORAL at 03:01

## 2023-03-01 RX ADMIN — HYDROMORPHONE HYDROCHLORIDE 1 MG: 1 INJECTION, SOLUTION INTRAMUSCULAR; INTRAVENOUS; SUBCUTANEOUS at 16:53

## 2023-03-01 RX ADMIN — ENOXAPARIN SODIUM 40 MG: 100 INJECTION SUBCUTANEOUS at 09:33

## 2023-03-01 RX ADMIN — METHYLPREDNISOLONE SODIUM SUCCINATE 80 MG: 125 INJECTION, POWDER, FOR SOLUTION INTRAMUSCULAR; INTRAVENOUS at 04:04

## 2023-03-01 RX ADMIN — SODIUM CHLORIDE, PRESERVATIVE FREE 10 ML: 5 INJECTION INTRAVENOUS at 20:23

## 2023-03-01 RX ADMIN — HYDROMORPHONE HYDROCHLORIDE 0.5 MG: 1 INJECTION, SOLUTION INTRAMUSCULAR; INTRAVENOUS; SUBCUTANEOUS at 04:04

## 2023-03-01 RX ADMIN — ACETAMINOPHEN 650 MG: 325 TABLET ORAL at 09:34

## 2023-03-01 RX ADMIN — SENNOSIDES 8.6 MG: 8.6 TABLET, COATED ORAL at 09:35

## 2023-03-01 RX ADMIN — HYDROMORPHONE HYDROCHLORIDE 0.5 MG: 1 INJECTION, SOLUTION INTRAMUSCULAR; INTRAVENOUS; SUBCUTANEOUS at 13:38

## 2023-03-01 RX ADMIN — HYDROMORPHONE HYDROCHLORIDE 1 MG: 2 TABLET ORAL at 03:00

## 2023-03-01 RX ADMIN — FAMOTIDINE 20 MG: 20 TABLET ORAL at 09:31

## 2023-03-01 RX ADMIN — SODIUM CHLORIDE, PRESERVATIVE FREE 10 ML: 5 INJECTION INTRAVENOUS at 09:43

## 2023-03-01 RX ADMIN — FAMOTIDINE 20 MG: 10 INJECTION, SOLUTION INTRAVENOUS at 09:35

## 2023-03-01 ASSESSMENT — PAIN SCALES - GENERAL
PAINLEVEL_OUTOF10: 8
PAINLEVEL_OUTOF10: 8
PAINLEVEL_OUTOF10: 7
PAINLEVEL_OUTOF10: 5
PAINLEVEL_OUTOF10: 0
PAINLEVEL_OUTOF10: 7
PAINLEVEL_OUTOF10: 7

## 2023-03-01 ASSESSMENT — PAIN DESCRIPTION - LOCATION
LOCATION: NECK
LOCATION: NECK;SHOULDER;ARM
LOCATION: NECK

## 2023-03-01 ASSESSMENT — PAIN DESCRIPTION - ORIENTATION
ORIENTATION: ANTERIOR
ORIENTATION: ANTERIOR
ORIENTATION: LEFT
ORIENTATION: ANTERIOR

## 2023-03-01 ASSESSMENT — PAIN DESCRIPTION - DESCRIPTORS
DESCRIPTORS: SORE

## 2023-03-01 NOTE — PERIOP NOTE
Patient verbalized pain after report given. Medicated. Also hungry. Provided with turket sandwich, drink, banana and davidson crackers.

## 2023-03-01 NOTE — CARE COORDINATION
03/01/23 1212   Service Assessment   Patient Orientation Alert and Oriented   Cognition Alert   History Provided By Patient   Primary [facility]  Parent   PCP Verified by CM   (pt has no PCP)   Prior Functional Level Independent in ADLs/IADLs   Current Functional Level Independent in ADLs/IADLs   Can patient return to prior living arrangement Other (see comment)  (pt states he is homeless and lives in a tent in [facility].)   Ability to make needs known: Good   Family able to assist with home care needs: No   Financial Resources None  (pt has no insurance)   Social/Functional History   Lives With Alone   Type of [address] [employer]   Transfer Assistance Independent   Discharge Planning   Type of [address] Prior To Admission None   DME Ordered? No   Patient expects to be discharged to: Shelter   Services At/After Discharge   Mode of Transport at Discharge   (unknown at this time. ? Lyft to shelter.)       Case Management Assessment  Initial Evaluation    Date/Time of Evaluation: 3/1/2023 12:15 PM  Assessment Completed by: Damaris Soriano RN    If patient is discharged prior to next notation, then this note serves as note for discharge by case management. 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   PCP verified by CM? (P)  (pt has no PCP)    Chart Reviewed: Yes      History Provided by: (P) Patient  Patient Orientation: (P) Alert and Oriented    Patient Cognition: (P) Alert    Hospitalization in the last 30 days (Readmission):  No    If yes, Readmission Assessment in  Navigator will be completed. Advance Directives:      Code Status: Full Code   Patient's Primary Decision Maker is:        Discharge Planning:    Patient lives with:   Type of Home: (P) Homeless  Primary Care Giver: (P) Self  Patient Support Systems include: (P) Parent   Current Financial resources: (P) None (pt has no insurance)  Current community resources:    Current services prior to admission: (P) None            Current DME:              Type of Home Care services:       ADLS  Prior functional level: (P) Independent in ADLs/IADLs  Current functional level: (P) Independent in ADLs/IADLs    PT AM-PAC:   /24  OT AM-PAC:   /24    Family can provide assistance at DC: (P) No  Would you like Case Management to discuss the discharge plan with any other family members/significant others, and if so, who? Plans to Return to Present Housing: (P) Other (see comment) (pt states he is homeless and lives in a tent in [facility].)  Other Identified Issues/Barriers to RETURNING to current housing: patient states he is homeless. States his father lives in the area, but he spoke to him last night and he cannot stay with his father.   Potential Assistance needed at discharge:              Potential DME:    Patient expects to discharge to: (P) [address] for transportation at discharge:      Financial    Payor: PENDING MEDICAID / Plan: PENDING MEDICAID / Product Type: *No Product type* /     Does insurance require precert for SNF: pt has no insurance    Potential assistance Purchasing Medications:    Meds-to-Beds request:        [address] [place], [place] [address]carlos [address] Juan Carlos Jones  [facility] [address]  Phone: [phone] Fax: [phone]      Notes:    Factors facilitating achievement of predicted outcomes: Pleasant    Barriers to discharge: Pain, No family support, No caregiver support, Upper extremity weakness, and No insurance coverage    Additional Case Management Notes: The Plan for Transition of Care is related to the following treatment goals of Abnormal CT scan, cervical spine [R93.7]  Closed wedge fracture of thoracic vertebra, unspecified thoracic vertebral level, [facility] [S22.000A]  Acute traumatic injury of cervical spine ([facility] [S14.109A]  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 Freedom of choice list with basic dialogue that supports the patient's individualized plan of care/goals and shares the quality data associated with the providers was provided to:     Patient Representative Name:       The Patient and/or Patient Representative Agree with the Discharge Plan?       Mehrdad Nelson RN  Case Management Department  Ph: [phone]

## 2023-03-01 NOTE — PROGRESS NOTES
[facility]  [facility]    Patient: Kelly Mcadams Age: 40 y.o. : 1985 MR#: [id number] SSN: [id number]  Date/Time: 2023     Subjective:   Patient uncomfortable and wants to eat. Reports left shoulder pain and neck pain. Discussed case with spine surgeon. Patient needs a C4-C6 which will happen today. Patient upset about not eating. Review of systems  General: No fevers or chills. Cardiovascular: No chest pain or pressure. No palpitations. Pulmonary: No shortness of breath, cough or wheeze. Gastrointestinal: No abdominal pain, nausea, vomiting or diarrhea. Genitourinary: No urinary frequency, urgency, hesitancy or dysuria. Musculoskeletal: No joint or muscle pain, no back pain, no recent trauma. Neurologic: No headache, numbness, tingling or weakness. Assessment/Plan:   Fall  Epidural hematoma with ligamentous disruption at C4-C6  Heroin use, reports snorting it. Homeless  5. Mild anemia    Patient to go for emergency surgery  Patient is at low risk for surgery and clear, no cardiopulmonary conditions. Regular diet afterward  Spine surgery following  IV steroids will continue for now  Iron levels, B12 and folate for mild anemia  ABX if indicated. Likely PT/OT after surgery. Recommended drug cessation      I spent 40 minutes with the patient in face-to-face consultation, of which greater than 50% was spent in counseling and coordination of care as described above.     Case discussed with:  [x]Patient  []Family  []Nursing  []Case Management  DVT Prophylaxis:  [x]Lovenox  []Hep SQ  []SCDs  []Coumadin   []Eliquis/Xarelto     Objective:   VS: /78   Pulse 93   Temp 97.8 °F (36.6 °C) (Temporal)   Resp 21   Ht 6' 4\" (1.93 m)   Wt 175 lb (79.4 kg)   SpO2 97%   BMI 21.30 kg/m²    Tmax/24hrs: Temp (24hrs), Av.6 °F (36.4 °C), Min:97 °F (36.1 °C), Max:97.8 °F (36.6 °C)  IOBRIEF  Intake/Output Summary (Last 24 hours) at 2023 2000  Last data filed at 2/28/2023 1736  Gross per 24 hour   Intake 1000 ml   Output --   Net 1000 ml       General:  Alert, cooperative, no acute distress. In C collar, slightly agitated. HEENT: PERRLA, anicteric sclerae. Pulmonary:  CTA Bilaterally. No Wheezing/Rales. Cardiovascular: Regular rate and Rhythm. GI:  Soft, Non distended, Non tender. + Bowel sounds. Extremities:  No edema. No calf tenderness. Psych: Good insight. Not anxious or agitated. Neurologic: Alert and oriented X 3. Moves all ext. Cervical radiculopathy on left. Weakness on the left.  Right upper extremity normal  Additional:    Medications:   Current Facility-Administered Medications   Medication Dose Route Frequency    HYDROmorphone HCl PF (DILAUDID) injection 0.5 mg  0.5 mg IntraVENous Q3H PRN    HYDROmorphone (DILAUDID) tablet 1 mg  1 mg Oral Q4H PRN    lactated ringers IV soln infusion   IntraVENous Continuous    lactated ringers IV soln infusion   IntraVENous Continuous    sodium chloride flush 0.9 % injection 5-40 mL  5-40 mL IntraVENous 2 times per day    HYDROmorphone (DILAUDID) injection 0.5 mg  0.5 mg IntraVENous PRN    ondansetron (ZOFRAN) injection 4 mg  4 mg IntraVENous Once PRN    fentaNYL (SUBLIMAZE) injection 50 mcg  50 mcg IntraVENous PRN    [Held by provider] vancomycin (VANCOCIN) 2000 mg in 0.9% sodium chloride 500 mL IVPB  2,000 mg IntraVENous Once    [Held by provider] vancomycin (VANCOCIN) 1500 mg in sodium chloride 0.9% 500 mL IVPB  1,500 mg IntraVENous Q12H    sodium chloride flush 0.9 % injection 5-40 mL  5-40 mL IntraVENous 2 times per day    sodium chloride flush 0.9 % injection 5-40 mL  5-40 mL IntraVENous PRN    0.9 % sodium chloride infusion   IntraVENous PRN    enoxaparin (LOVENOX) injection 40 mg  40 mg SubCUTAneous Daily    ondansetron (ZOFRAN-ODT) disintegrating tablet 4 mg  4 mg Oral Q8H PRN    Or    ondansetron (ZOFRAN) injection 4 mg  4 mg IntraVENous Q6H PRN    acetaminophen (TYLENOL) tablet 650 mg 650 mg Oral Q6H PRN    Or    acetaminophen (TYLENOL) suppository 650 mg  650 mg Rectal Q6H PRN    senna (SENOKOT) tablet 8.6 mg  1 tablet Oral BID    famotidine (PEPCID) tablet 20 mg  20 mg Oral BID    methylPREDNISolone sodium (SOLU-MEDROL) injection 80 mg  80 mg IntraVENous Q6H    [Held by provider] HYDROmorphone (DILAUDID) 1 mg/mL PCA   IntraVENous Continuous     Facility-Administered Medications Ordered in Other Encounters   Medication Dose Route Frequency    lidocaine PF 2 % injection   IntraVENous PRN    propofol injection   IntraVENous PRN    succinylcholine chloride (ANECTINE) injection   IntraVENous PRN    dexmedetomidine (PRECEDEX) injection   IntraVENous PRN    rocuronium (ZEMURON) injection   IntraVENous PRN    dexamethasone (DECADRON) injection   IntraVENous PRN    midazolam (VERSED) injection   IntraVENous PRN    fentaNYL (SUBLIMAZE) injection   IntraVENous PRN    glycopyrrolate injection   IntraVENous PRN    ceFAZolin (ANCEF) injection   IntraVENous PRN    sterile water injection   Buccal PRN    HYDROmorphone (DILAUDID) injection   IntraVENous PRN    ondansetron (ZOFRAN) injection   IntraVENous PRN       Labs:    Recent Results (from the past 24 hour(s))   Comprehensive Metabolic Panel w/ Reflex to MG    Collection Time: 02/28/23  6:20 AM   Result Value Ref Range    Sodium 135 (L) 136 - 145 mmol/L    Potassium 4.7 3.5 - 5.5 mmol/L    Chloride 104 100 - 111 mmol/L    CO2 26 21 - 32 mmol/L    Anion Gap 5 3.0 - 18 mmol/L    Glucose 136 (H) 74 - 99 mg/dL    BUN 11 7.0 - 18 MG/DL    Creatinine 0.63 0.6 - 1.3 MG/DL    Bun/Cre Ratio 17 12 - 20      Est, Glom Filt Rate >60 >60 ml/min/1.73m2    Calcium 8.9 8.5 - 10.1 MG/DL    Total Bilirubin 0.4 0.2 - 1.0 MG/DL    ALT 89 (H) 16 - 61 U/L    AST 24 10 - 38 U/L    Alk Phosphatase 112 45 - 117 U/L    Total Protein 7.2 6.4 - 8.2 g/dL    Albumin 2.6 (L) 3.4 - 5.0 g/dL    Globulin 4.6 (H) 2.0 - 4.0 g/dL    Albumin/Globulin Ratio 0.6 (L) 0.8 - 1.7     CBC with Auto Differential    Collection Time: 02/28/23  6:20 AM   Result Value Ref Range    WBC 5.4 4.6 - 13.2 K/uL    RBC 3.69 (L) 4.35 - 5.65 M/uL    Hemoglobin 11.0 (L) 13.0 - 16.0 g/dL    Hematocrit 33.8 (L) 36.0 - 48.0 %    MCV 91.6 78.0 - 100.0 FL    MCH 29.8 24.0 - 34.0 PG    MCHC 32.5 31.0 - 37.0 g/dL    RDW 13.2 11.6 - 14.5 %    Platelets 848 (H) 354 - 420 K/uL    MPV 8.5 (L) 9.2 - 11.8 FL    Nucleated RBCs 0.0 0  WBC    nRBC 0.00 0.00 - 0.01 K/uL    Seg Neutrophils 75 (H) 40 - 73 %    Lymphocytes 24 21 - 52 %    Monocytes 1 (L) 3 - 10 %    Eosinophils % 0 0 - 5 %    Basophils 0 0 - 2 %    Immature Granulocytes 0 0.0 - 0.5 %    Segs Absolute 4.0 1.8 - 8.0 K/UL    Absolute Lymph # 1.3 0.9 - 3.6 K/UL    Absolute Mono # 0.1 0.05 - 1.2 K/UL    Absolute Eos # 0.0 0.0 - 0.4 K/UL    Basophils Absolute 0.0 0.0 - 0.1 K/UL    Absolute Immature Granulocyte 0.0 0.00 - 0.04 K/UL    Differential Type AUTOMATED     Iron and TIBC    Collection Time: 02/28/23  6:20 AM   Result Value Ref Range    Iron 39 (L) 50 - 175 ug/dL    TIBC 248 (L) 250 - 450 ug/dL    Iron % Saturation 16 (L) 20 - 50 %   Ferritin    Collection Time: 02/28/23  6:20 AM   Result Value Ref Range    Ferritin 170 8 - 388 NG/ML   Vitamin B12 & Folate    Collection Time: 02/28/23  6:20 AM   Result Value Ref Range    Vitamin B-12 635 211 - 911 pg/mL    Folate 9.3 3.10 - 17.50 ng/mL   APTT    Collection Time: 02/28/23 10:36 AM   Result Value Ref Range    PTT 30.4 23.0 - 36.4 SEC   COVID-19, Rapid    Collection Time: 02/28/23  1:00 PM    Specimen: Nasopharyngeal   Result Value Ref Range    Source Nasopharyngeal      SARS-CoV-2, Rapid Not detected NOTD         Signed By: Hamida Sanchez DO     February 28, 2023      Disclaimer: Sections of this note are dictated using utilizing voice recognition software. Minor typographical errors may be present. If questions arise, please do not hesitate to contact me or call our department.

## 2023-03-01 NOTE — PROGRESS NOTES
Progress Note    Patient: Madeleine Cedeño MRN: [id number]  SSN: [id number]    YOB: 1985  Age: 40 y.o. Sex: male      Admit Date: 2/27/2023    LOS: 2 days     Subjective:     Less pain L UE s/p cervical fusion     Objective:     Vitals:    03/01/23 0000 03/01/23 0400 03/01/23 0830 03/01/23 1100   BP: 120/75 118/74 117/73 111/68   Pulse: 88 86 89 70   Resp: 16 16 18 14   Temp: 98.1 °F (36.7 °C) 98 °F (36.7 °C) 97 °F (36.1 °C) 97 °F (36.1 °C)   TempSrc: Axillary Oral Oral Oral   SpO2: 98% 99% 96% 98%   Weight:       Height:            Intake and Output:  Current Shift: 03/01 0701 - 03/01 1900  In: -   Out: 10 [Drains:10]  Last three shifts: 02/27 1901 - 03/01 0700  In: 1000 [I.V.:1000]  Out: 10 [Drains:10]    Physical Exam:   Mild anterior L shoulder tenderness  L shoulder motion improved  Hard cervical collar in place      Assessment:     Principal Problem:    Acute traumatic injury of cervical spine (HCC)  Active Problems:    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 *      Plan:     F/U in my office PRN post DC   We will consider ordering a L shoulder MRI scan if pain continues.     Signed By: Vaughn Enrique MD     March 1, 2023

## 2023-03-01 NOTE — PLAN OF CARE
Problem: SLP Adult - Impaired Swallowing  Goal: By Discharge: Advance to least restrictive diet without signs or symptoms of aspiration for planned discharge setting. See evaluation for individualized goals. Description:   Patient will:  1. Tolerate PO trials with 0 s/s overt distress in 4/5 trials  2. Utilize compensatory swallow strategies/maneuvers (decrease bite/sip, size/rate, alt. liq/sol) with min cues in 4/5 trials    Rec:     Regular diet with thin liquids  Aspiration precautions  HOB >45 during po intake, remain >30 for 30-45 minutes after po   Small bites/sips; alternate liquid/solid with slow feeding rate   Oral care TID  Meds per pt preference  Outcome: Progressing    SPEECH LANGUAGE PATHOLOGY BEDSIDE SWALLOW EVALUATION/TREATMENT    Patient: [facility] (65 y.o. 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 Pt tolerated reg solids and thin liquids + straw consecutive swallows without any overt s/sx of aspiration. Mastication was functional/timely with positive oral clearance. Suspect slightly weakened laryngeal elevation given that pt is post op with j-p drain in place. Pt c/o slight odynophagia with PO. Recommend regular solid diet with thin liquids, aspiration precautions, oral care TID, and meds as tolerated.       TREATMENT:  Skilled therapy initiated; Educated patient on aspiration precautions and importance of compensatory swallow techniques to decrease aspiration risk (decrease rate of intake & sip/bite size, upright @HOB for all po intake and ~30 minutes after po); verbalized comprehension. ST to follow up x 1-2 more visits to ensure safety of PO. Patient will benefit from skilled intervention to address the above impairments. Patient's rehabilitation potential good . Factors which may influence rehabilitation potential include:   []            None noted  []            Mental ability/status  [x]            Medical condition  []            Home/family situation and support systems  []            Safety awareness  []            Pain tolerance/management  []            Other:      PLAN :  Recommendations and Planned Interventions:  Recommendations  Requires SLP Intervention: Yes  Recommendations: Dysphagia treatment  D/C Recommendations: Ongoing speech therapy is recommended during this hospitalization  Diet Solids Recommendation: Regular  Liquid Consistency Recommendation: Thin  Compensatory Swallowing Strategies : Alternate solids and liquids;Small bites/sips;Eat/Feed slowly;Effortful swallow  Recommended Form of Meds: PO  Therapeutic Interventions: Patient/Family education;Diet tolerance monitoring  Patient Education: Educated on safe swallowing techniques/strategies, oropharyngeal anatomy/physiology s/p surgery, and compensatory swallow strategies. Patient Education Response: Verbalizes understanding;Needs reinforcement    Frequency/Duration: Patient will be followed by speech-language pathology x 1-2 more visits to address goals. Discharge Recommendations: D/C Recommendations: Ongoing speech therapy is recommended during this hospitalization     SUBJECTIVE:   Patient stated, \"It just hurts a little when I swallow. \"    OBJECTIVE:     Past Medical History:   Diagnosis Date    Heroin addiction [facility])      Past Surgical History:   Procedure Laterality Date    CERVICAL FUSION N/A 2/28/2023    CERVICAL DISCECTOMY FUSION ANTERIOR ONE LEVEL CERVICAL FOUR/FIVE, FIVE/SIX; C JUAN REED performed by Estrellita Moore MD at [address]       Prior Level of Function/Home Situation: no hx of dysphagia     Baseline Assessment:  Baseline Assessment  Temperature Spikes Noted: No  Respiratory Status: Room air  O2 Device: None (Room air)  Communication Observation: Functional  Follows Directions: Complex  Current Diet : Regular  Current Liquid Diet : Thin  Dentition: Adequate  Patient Positioning: Upright in bed  Baseline Vocal Quality: Normal  Orientation: oriented x 4 amanda  Oral Assessment:  Oral Motor   Labial: No impairment  Dentition: Natural;Intact  Oral Hygiene: Clean;Moist  Lingual: No impairment  Velum: No Impairment  Mandible: No impairment  Gag: No Impairment        P.O. Trials:  PO Trials  Neuromuscular Estim Used: No  Assessment Method(s): Observation;Palpation  Vocal Quality: No Impairment  Consistency Presented: Thin;Regular  How Presented: Self-fed/presented;Straw;Successive Swallows  Bolus Acceptance: No impairment  Bolus Formation/Control: No impairment  Propulsion: No impairment  Oral Residue: None  Initiation of Swallow: No impairment  Laryngeal Elevation: Functional  Aspiration Signs/Symptoms: None  Pharyngeal Phase Characteristics: Feeling of discomfort  Effective Modifications:  Alternate liquids/solids;Small sips and bites  Cues for Modifications: Minimal        DYSPHAGIA DIAGNOSIS: Dysphagia Diagnosis: Mild pharyngeal stage dysphagia    PAIN:  Start of Eval: 0  End of Eval: 0     After treatment:   []            Patient left in no apparent distress sitting up in chair  [x]            Patient left in no apparent distress in bed  [x]            Call bell left within reach  [x]            Nursing notified  []            Family present  []            Caregiver present  []            Bed alarm activated    COMMUNICATION/EDUCATION:   [x]            Aspiration precautions; swallow safety; compensatory techniques provided via demonstration, verbalization and teach back of comprehension  []         Patient/family have participated as able in goal setting and plan of care. []            Patient/family agree to work toward stated goals and plan of care. []            Patient understands intent and goals of therapy, neutral about participation. []            Patient unable to participate in goal setting/plan of care secondary to cognition, hearing/vision deficits; education ongoing with interdisciplinary staff   []            Handout regarding diet recommendations and thickener instructions provided. [x]         Posted safety precautions in patient's room.     Thank you for this referral.    Pallavi Lunsford M.S., CCC-SLP/L  Speech-Language Pathologist

## 2023-03-01 NOTE — PROGRESS NOTES
OCCUPATIONAL THERAPY EVALUATION/DISCHARGE    Patient: Sarah Ponec (3 y.o. 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 Pt reports pain in LUE 8/10 at rest and 10/10 with SROM. Pt reports being at baseline and able to perform ADLs and functional mobility to bathroom w/o difficulty, reports being agitated that he keeps being asked whether or not he can walk. Pt demonstrates LUE shoulder flexion ~ 85 degrees flexion, SROM WFL, elbow, wrist and digit AROM WFL, able to perform gross serial opposition of thumb to digits. Pt encouraged to continued SROM of LUE shoulder as tolerated e.g. flexion/extension, horizontal abd/adduction-nursing notified. Call bell within reach & pt verbalized understanding to utilize for assist e.g. functional transfers in order to prevent falls. .    Maximum therapeutic gains met at current level of care and patient will be discharged from occupational therapy at this time. Further Equipment Recommendations for Discharge: shower chair for energy conservation     AMPAC:   At this time and based on an AM-PAC score of 24/24, no further OT is recommended upon discharge due to patient at baseline functional status. Recommend patient returns to prior setting with prior services.      This AMPAC score should be considered in conjunction with interdisciplinary team recommendations to determine the most appropriate discharge setting. Patient's social support, diagnosis, medical stability, and prior level of function should also be taken into consideration. SUBJECTIVE:   Patient stated I'm homeless. 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  []  Right hand dominant   []  Left hand dominant    Cognitive/Behavioral Status:  Orientation  Overall Orientation Status: Within Normal Limits  Orientation Level: Oriented X4  Cognition  Overall Cognitive Status: WNL    Skin: s/p cervical sx  Edema:  s/p cervical sx    Vision/Perceptual:    Vision  Vision: Within Functional Limits       Coordination: BUE  Coordination: Generally decreased, functional (LUE shoulder impaired)            Strength: BUE  Strength: Generally decreased, functional (LUE shoulder impaired)    Tone & Sensation: BUE  Tone: Normal       Range of Motion: BUE  AROM: Generally decreased, functional (per nursing, pt using affected LUE to manage IV pole during functional mobility to bathroom, LUE shoulder impaired w/ flexion ~ 85 degrees)  PROM: Within functional limits         ADL Assessment:   Feeding: Modified independent   Grooming: Modified independent   UE Bathing: Modified independent   LE Bathing: Modified independent   UE Dressing: Modified independent   LE Dressing: Modified independent   Toileting: Modified independent       Pain:  Pain level pre-treatment: 8/10 Pain level post-treatment: 8/10   Pain Intervention(s): Rest, Ice, Repositioning   Response to intervention: Nurse notified    Activity Tolerance:   Activity Tolerance: Treatment limited secondary to agitation  Please refer to the flowsheet for vital signs taken during this treatment. After treatment:   [] Patient left in no apparent distress sitting up in chair  [x] Patient left in no apparent distress in bed  [x] Call bell left within reach  [x] Nursing notified  [] Caregiver present  [] Bed alarm activated    COMMUNICATION/EDUCATION:   Patient Education  Education Given To: Patient  Education Provided: Role of Therapy  Education Method: Verbal  Barriers to Learning: None  Education Outcome: Verbalized understanding    Thank you for this referral.  Irasema Savage OT  Minutes: 9    Eval Complexity: Decision Making: MADHU [place] 39 AM-PAC® Daily Activity Inpatient Short Form (6-Clicks)*    How much HELP from another person does the patient currently need    (If the patient hasn't done an activity recently, how much help from another person do you think he/she would need if he/she tried?)   Total (Total A or Dep)   A Lot  (Mod to Max A)   A Little (Sup or Min A)   None (Mod I to I)   Putting on and taking off regular lower body clothing? [] 1 [] 2 [] 3 [x] 4   2. Bathing (including washing, rinsing,      drying)? [] 1 [] 2 [] 3 [x] 4   3. Toileting, which includes using toilet, bedpan or urinal?   [] 1 [] 2 [] 3 [x] 4   4. Putting on and taking off regular upper body clothing? [] 1 [] 2 [] 3 [x] 4   5. Taking care of personal grooming such as brushing teeth? [] 1 [] 2 [] 3 [x] 4   6. Eating meals?    [] 1 [] 2 [] 3 [x] 4

## 2023-03-01 NOTE — PROGRESS NOTES
1 day s/p ACDF  Neuro intact  Wearing collar  Minimal output in MERVIN  Shooting pain into left arm has resolved  C/o expected neck pain, arm pain  Mobilize  Pt ot

## 2023-03-01 NOTE — PERIOP NOTE
TRANSFER - OUT REPORT:    Verbal report given to [address] on Sorin Quigley  being transferred to room 358 for routine progression of patient care       Report consisted of patient's Situation, Background, Assessment and   Recommendations(SBAR). Information from the following report(s) Nurse Handoff Report was reviewed with the receiving nurse. [place] Assessment: No data recorded  Lines:   Peripheral IV 02/28/23 Left Antecubital (Active)   Site Assessment Clean, dry & intact 02/28/23 1838   Phlebitis Assessment No symptoms 02/28/23 1838   Infiltration Assessment 0 02/28/23 1838   Dressing Status Clean, dry & intact 02/28/23 1838   Dressing Type Transparent 02/28/23 1838       Peripheral IV 02/28/23 Right;Posterior Hand (Active)   Site Assessment Clean, dry & intact 02/28/23 1838   Line Status Infusing 02/28/23 1838   Phlebitis Assessment No symptoms 02/28/23 1838   Infiltration Assessment 0 02/28/23 1838   Dressing Status Clean, dry & intact 02/28/23 1838   Dressing Type Transparent 02/28/23 1838        Opportunity for questions and clarification was provided.       Patient transported with:  [employer]

## 2023-03-01 NOTE — PROGRESS NOTES
[facility]  Progress Note    Patient: Everett Hamilton Age: 40 y.o. : 1985 MR#: [id number] SSN: [id number]  Date/Time: 3/1/2023     Subjective:   Patient doing well. S/P cervical disectomy fusion of C4-C6. Clear for discharge per spine surgery. Patient homeless and has a hotel room possibly pending tomorrow. Review of systems  General: No fevers or chills. Cardiovascular: No chest pain or pressure. No palpitations. Pulmonary: No shortness of breath, cough or wheeze. Gastrointestinal: No abdominal pain, nausea, vomiting or diarrhea. Genitourinary: No urinary frequency, urgency, hesitancy or dysuria. Musculoskeletal: No joint or muscle pain, no back pain, no recent trauma. Neurologic: No headache, numbness, tingling or weakness. Assessment/Plan:     Fall  Epidural hematoma with ligamentous disruption at C4-C6  Heroin use, reports snorting it. Homeless  5. Mild anemia  6.   leukocytosis  7. Thrombocytosis     Patient received cervical discectomy and fusion of C4-C6  Clear to discharge from spine surgery perspective  Likely discharge tomorrow  AVOID escalation of pain medicine given drug use  Remove drain per spine surgery  Regular diet afterward  Spine surgery following  IV steroids stopped  Iron levels slightly low, may benefit from replacement on dc   B12 and folate WNL  Pt/OT recommending home. Leukocytosis relating to IV steroids initial given  Thrombocytosis likely relating to injury/procedure and is likely reactive. Recommended drug cessation      I spent 40 minutes with the patient in face-to-face consultation, of which greater than 50% was spent in counseling and coordination of care as described above.      Case discussed with:  [x]Patient  []Family  []Nursing  []Case Management  DVT Prophylaxis:  [x]Lovenox  []Hep SQ  []SCDs  []Coumadin   []Eliquis/Xarelto     Objective:   VS: /80   Pulse 70   Temp 96.8 °F (36 °C) (Oral)   Resp 18   Ht 6' 4\" (1.93 m)   Wt 175 lb (79.4 kg)   SpO2 97%   BMI 21.30 kg/m²    Tmax/24hrs: Temp (24hrs), Av.6 °F (36.4 °C), Min:96.8 °F (36 °C), Max:98.1 °F (36.7 °C)  IOBRIEF  Intake/Output Summary (Last 24 hours) at 3/1/2023 1739  Last data filed at 3/1/2023 1502  Gross per 24 hour   Intake --   Output 30 ml   Net -30 ml     General:  Alert, cooperative, no acute distress. In post surgical collar, Drain in place   HEENT: PERRLA, anicteric sclerae. Pulmonary:  CTA Bilaterally. No Wheezing/Rales. Cardiovascular: Regular rate and Rhythm. GI:  Soft, Non distended, Non tender. + Bowel sounds. Extremities:  No edema. No calf tenderness. Mild anterior left shoulder tenderness  Psych: Good insight. Not anxious or agitated. Neurologic: Alert and oriented X 3. Moves all ext. Weakness on the left. Left shoulder ROM improved.  Right upper extremity normal  Additional:    Medications:   Current Facility-Administered Medications   Medication Dose Route Frequency    HYDROmorphone HCl PF (DILAUDID) injection 1 mg  1 mg IntraVENous Q3H PRN    HYDROmorphone (DILAUDID) injection 0.25 mg  0.25 mg IntraVENous Q3H PRN    HYDROmorphone (DILAUDID) tablet 1 mg  1 mg Oral Q4H PRN    lactated ringers IV soln infusion   IntraVENous Continuous    0.45 % sodium chloride infusion   IntraVENous Continuous    sodium chloride flush 0.9 % injection 5-40 mL  5-40 mL IntraVENous 2 times per day    sodium chloride flush 0.9 % injection 5-40 mL  5-40 mL IntraVENous PRN    0.9 % sodium chloride infusion   IntraVENous PRN    acetaminophen (TYLENOL) tablet 650 mg  650 mg Oral Q6H    oxyCODONE (ROXICODONE) immediate release tablet 5 mg  5 mg Oral Q4H PRN    famotidine (PEPCID) tablet 20 mg  20 mg Oral BID    Or    famotidine (PEPCID) 20 mg in sodium chloride (PF) 0.9 % 10 mL injection  20 mg IntraVENous BID    diphenhydrAMINE (BENADRYL) capsule 25 mg  25 mg Oral Q6H PRN    Or    diphenhydrAMINE (BENADRYL) injection 25 mg  25 mg IntraVENous Q6H PRN    ondansetron (ZOFRAN-ODT) disintegrating tablet 4 mg  4 mg Oral Q8H PRN    Or    ondansetron (ZOFRAN) injection 4 mg  4 mg IntraVENous Q6H PRN    polyethylene glycol (GLYCOLAX) packet 17 g  17 g Oral Daily    bisacodyl (DULCOLAX) EC tablet 5 mg  5 mg Oral Daily    metaxalone (SKELAXIN) tablet 800 mg  800 mg Oral Q8H PRN    benzocaine-menthol (CEPACOL SORE THROAT) lozenge 1 lozenge  1 lozenge Oral Q2H PRN    lactated ringers IV soln infusion   IntraVENous Continuous    sodium chloride flush 0.9 % injection 5-40 mL  5-40 mL IntraVENous 2 times per day    sodium chloride flush 0.9 % injection 5-40 mL  5-40 mL IntraVENous PRN    0.9 % sodium chloride infusion   IntraVENous PRN    enoxaparin (LOVENOX) injection 40 mg  40 mg SubCUTAneous Daily    ondansetron (ZOFRAN-ODT) disintegrating tablet 4 mg  4 mg Oral Q8H PRN    Or    ondansetron (ZOFRAN) injection 4 mg  4 mg IntraVENous Q6H PRN    acetaminophen (TYLENOL) tablet 650 mg  650 mg Oral Q6H PRN    Or    acetaminophen (TYLENOL) suppository 650 mg  650 mg Rectal Q6H PRN    senna (SENOKOT) tablet 8.6 mg  1 tablet Oral BID    famotidine (PEPCID) tablet 20 mg  20 mg Oral BID    [Held by provider] HYDROmorphone (DILAUDID) 1 mg/mL PCA   IntraVENous Continuous       Labs:    Recent Results (from the past 24 hour(s))   CBC    Collection Time: 03/01/23  1:33 AM   Result Value Ref Range    WBC 16.3 (H) 4.6 - 13.2 K/uL    RBC 3.33 (L) 4.35 - 5.65 M/uL    Hemoglobin 10.1 (L) 13.0 - 16.0 g/dL    Hematocrit 32.2 (L) 36.0 - 48.0 %    MCV 96.7 78.0 - 100.0 FL    MCH 30.3 24.0 - 34.0 PG    MCHC 31.4 31.0 - 37.0 g/dL    RDW 13.0 11.6 - 14.5 %    Platelets 418 (H) 847 - 420 K/uL    MPV 8.5 (L) 9.2 - 11.8 FL    Nucleated RBCs 0.0 0  WBC    nRBC 0.00 0.00 - 0.01 K/uL   Basic Metabolic Panel    Collection Time: 03/01/23 11:12 AM   Result Value Ref Range    Sodium 134 (L) 136 - 145 mmol/L    Potassium 4.1 3.5 - 5.5 mmol/L    Chloride 104 100 - 111 mmol/L    CO2 27 21 - 32 mmol/L Anion Gap 3 3.0 - 18 mmol/L    Glucose 141 (H) 74 - 99 mg/dL    BUN 18 7.0 - 18 MG/DL    Creatinine 0.55 (L) 0.6 - 1.3 MG/DL    Bun/Cre Ratio 33 (H) 12 - 20      Est, Glom Filt Rate >60 >60 ml/min/1.73m2    Calcium 8.6 8.5 - 10.1 MG/DL   CBC    Collection Time: 03/01/23 11:12 AM   Result Value Ref Range    WBC 17.2 (H) 4.6 - 13.2 K/uL    RBC 3.68 (L) 4.35 - 5.65 M/uL    Hemoglobin 11.2 (L) 13.0 - 16.0 g/dL    Hematocrit 34.4 (L) 36.0 - 48.0 %    MCV 93.5 78.0 - 100.0 FL    MCH 30.4 24.0 - 34.0 PG    MCHC 32.6 31.0 - 37.0 g/dL    RDW 12.9 11.6 - 14.5 %    Platelets 934 (H) 920 - 420 K/uL    MPV 8.7 (L) 9.2 - 11.8 FL    Nucleated RBCs 0.0 0  WBC    nRBC 0.00 0.00 - 0.01 K/uL       Signed By: Jennifer Ramos DO     March 1, 2023      Disclaimer: Sections of this note are dictated using utilizing voice recognition software. Minor typographical errors may be present. If questions arise, please do not hesitate to contact me or call our department.

## 2023-03-01 NOTE — PROGRESS NOTES
PT eval order received and chart reviewed. Spoke with patient who reports they are at functional baseline for mobility. He was edu on cervical precautions and use of cervical collar. Patient reports he has been amb around without difficulty, and has \"no issues with my legs. \" Pt reports not needing further need for assist or AD/DME. Pt does c/o of UE weakness, deferred to OT at this time. Will sign off per protocol and educated patient that if their functional mobility needs should change that they may have MD re-order PT at any time.  Thank you for this referral.     Michael Nguyen, PT, DPT

## 2023-03-01 NOTE — ANESTHESIA POSTPROCEDURE EVALUATION
Department of Anesthesiology  Postprocedure Note    Patient: Kip Shelby  MRN: [id number]  YOB: 1985  Date of evaluation: 2/28/2023      Procedure Summary     Date: 02/28/23 Room / Location: [facility] MAIN 06 / [facility] MAIN OR    Anesthesia Start: 1611 Anesthesia Stop: 1848    Procedure: CERVICAL DISCECTOMY FUSION ANTERIOR ONE LEVEL CERVICAL FOUR/FIVE, FIVE/SIX; C ARM, JUAN (Spine Cervical) Diagnosis:       HNP (herniated nucleus pulposus), cervical      (HNP (herniated nucleus pulposus), cervical [M50.20])    Surgeons: Swapna Garcia MD Responsible Provider: Connie Fan MD    Anesthesia Type: General ASA Status: 3          Anesthesia Type: General    Michael Phase I: Michael Score: 10    Michael Phase II:        Anesthesia Post Evaluation    Patient location during evaluation: PACU  Patient participation: complete - patient participated  Level of consciousness: awake  Airway patency: patent  Nausea & Vomiting: no nausea  Complications: no  Cardiovascular status: blood pressure returned to baseline  Respiratory status: acceptable  Hydration status: euvolemic

## 2023-03-01 NOTE — OP NOTE
[facility]  OPERATIVE REPORT    Name:  MISHEL BENSON  MR#:   [id number]  :  1985  ACCOUNT #:  [id number]  DATE OF SERVICE:  2023    PREOPERATIVE DIAGNOSES:  C5 fracture, C4-C5 and C5-C6 subluxation.    POSTOPERATIVE DIAGNOSES:  C5 fracture, C4-C5 and C5-C6 subluxation.    PROCEDURES PERFORMED:  Anterior cervical decompression and fusion, C4-C5; anterior cervical decompression and fusion, C5-C6; debridement of C5 fracture; structural allograft x2; anterior cervical plate fixation, C4, C5, C6, Lul type.    SURGEON:  Cesar Tang MD.    ASSISTANT:  0.    ANESTHESIA:  General endotracheal.    COMPLICATIONS:  0.    SPECIMENS REMOVED:  0.    IMPLANTS:  lul .    ESTIMATED BLOOD LOSS:  minimal.    FINDINGS:  This was not consistent with an acute fracture.  There were inflammatory changes of a more subacute chronic nature anteriorly.  There were fracture fragments, but no gross hematoma anteriorly.  Inflammatory changes with edema of the ALL.  Thickening of the longus colli musculature and adherence of the esophagus and trachea down to this inflammatory posttraumatic mass.  Radiographic imaging confirmed subluxation of the various segments with gross disruption of the ALL and the PLL.  Fracture was  through the anterior aspect of C5 affecting the C4-C5 interval.  Retrolisthesis of C4 and C5.    At the conclusion, we had the facets realigned, disk space and aligned normalized.  Care was taken to decompress each level.  The ALL and PLL were disrupted.  Posteriorly, the hematoma epidural was drained.  It was more of a fluid collection than an acute hematoma.    DESCRIPTION OF PROCEDURE:  Following induction of general endotracheal anesthesia, the patient was placed with head maintained in a neutral position where he had his most normal neurology.  The patient was prepped and draped in the usual fashion.  A carotid incision was made on the right.  The sternocleidomastoid and great vessels  were mobilized laterally. Esophagus and trachea were mobilized medially with blunt finger dissection. The prevertebral fascia was entered. It was densely scarified. The ALL was thickened and elevated. The C5-C6 disk was localized. The longus colli musculature was mobilized and the thickened ALL resected. This was done taking up to C4. [place] pins were placed in the bodies of first C5, then C6, then C4 and C5. The anterior aspect of C5 was fractured. An annulotomy was done, which was essentially disrupted, but the scarified tissue at the annulus incised. The disk was debrided to the posterior margin. Posterior marginal osteophytes especially at C5 were resected with a 4.0 Bri curette and sella punch. The ALL was disrupted. Uncinates were resected. Disk space was decompressed and then the cord decompressed with gentle distraction. Given disk space normalized to 7 mm, a 7 mm structural allograft was tamped firmly into place with excellent bony apposition. The scarified material again was debrided. [place] pins were placed in C4 and C5. Cloward self-retaining retractor blades were placed under the cover of longus colli musculature bilaterally. Annular tissue was incised. The anterior aspect of C5 was debrided. There was fractured bone and disk material extruded anteriorly from C4-C5. This was debrided. The disk space was defined and debrided back to posterior marginal osteophytes. There was retrolisthesis of C4 and C5. This was all decompressed. Endplates were prepared. Epidural space was decompressed with special care taken to try to make sure that the left lateral recess and foramina were decompressed. Another #7 structural allograft was then tamped firmly into place with bony apposition. AP and lateral images demonstrated central placement of the devices, appropriate disk space elevation, and restoration of normal alignment with normal facet alignment and rotation of the spine.   An anterior cervical locking plate was then utilized, [place] type, with two screws in C4, two in C5, two in C6, and the locking device engaged. A deep drain was placed. There was no active bleeding. There was no CSF leakage. Spine appeared to  be stabilized, realigned, and normalized. Vancomycin powder was instilled for infection prophylaxis. The neck was closed in layers and the skin closed with subcuticular suture and Dermabond. Sterile occlusive dressing was placed upon the wound. Soft collar was utilized. The patient was brought to Recovery in good and stable condition.       Maggie Daly MD      [id number]  D:  02/28/2023 18:31  T:  03/01/2023 5:41  JOB #:  [id number]

## 2023-03-02 VITALS
WEIGHT: 175 LBS | HEIGHT: 76 IN | BODY MASS INDEX: 21.31 KG/M2 | HEART RATE: 82 BPM | OXYGEN SATURATION: 97 % | RESPIRATION RATE: 18 BRPM | DIASTOLIC BLOOD PRESSURE: 66 MMHG | TEMPERATURE: 97.6 F | SYSTOLIC BLOOD PRESSURE: 107 MMHG

## 2023-03-02 LAB
ALBUMIN SERPL-MCNC: 2.3 G/DL (ref 3.4–5)
ALBUMIN/GLOB SERPL: 0.6 (ref 0.8–1.7)
ALP SERPL-CCNC: 83 U/L (ref 45–117)
ALT SERPL-CCNC: 60 U/L (ref 16–61)
ANION GAP SERPL CALC-SCNC: 2 MMOL/L (ref 3–18)
AST SERPL-CCNC: 21 U/L (ref 10–38)
BILIRUB SERPL-MCNC: 0.4 MG/DL (ref 0.2–1)
BUN SERPL-MCNC: 18 MG/DL (ref 7–18)
BUN/CREAT SERPL: 27 (ref 12–20)
CALCIUM SERPL-MCNC: 8.3 MG/DL (ref 8.5–10.1)
CHLORIDE SERPL-SCNC: 105 MMOL/L (ref 100–111)
CO2 SERPL-SCNC: 30 MMOL/L (ref 21–32)
CREAT SERPL-MCNC: 0.66 MG/DL (ref 0.6–1.3)
ERYTHROCYTE [DISTWIDTH] IN BLOOD BY AUTOMATED COUNT: 12.9 % (ref 11.6–14.5)
GLOBULIN SER CALC-MCNC: 4 G/DL (ref 2–4)
GLUCOSE SERPL-MCNC: 95 MG/DL (ref 74–99)
HCT VFR BLD AUTO: 34.4 % (ref 36–48)
HGB BLD-MCNC: 10.9 G/DL (ref 13–16)
MCH RBC QN AUTO: 30.1 PG (ref 24–34)
MCHC RBC AUTO-ENTMCNC: 31.7 G/DL (ref 31–37)
MCV RBC AUTO: 95 FL (ref 78–100)
NRBC # BLD: 0 K/UL (ref 0–0.01)
NRBC BLD-RTO: 0 PER 100 WBC
PLATELET # BLD AUTO: 408 K/UL (ref 135–420)
PMV BLD AUTO: 8.7 FL (ref 9.2–11.8)
POTASSIUM SERPL-SCNC: 3.7 MMOL/L (ref 3.5–5.5)
PROT SERPL-MCNC: 6.3 G/DL (ref 6.4–8.2)
RBC # BLD AUTO: 3.62 M/UL (ref 4.35–5.65)
SODIUM SERPL-SCNC: 137 MMOL/L (ref 136–145)
WBC # BLD AUTO: 9 K/UL (ref 4.6–13.2)

## 2023-03-02 PROCEDURE — 36415 COLL VENOUS BLD VENIPUNCTURE: CPT

## 2023-03-02 PROCEDURE — 85027 COMPLETE CBC AUTOMATED: CPT

## 2023-03-02 PROCEDURE — 96372 THER/PROPH/DIAG INJ SC/IM: CPT

## 2023-03-02 PROCEDURE — 6370000000 HC RX 637 (ALT 250 FOR IP): Performed by: ORTHOPAEDIC SURGERY

## 2023-03-02 PROCEDURE — 6360000002 HC RX W HCPCS: Performed by: ORTHOPAEDIC SURGERY

## 2023-03-02 PROCEDURE — 80053 COMPREHEN METABOLIC PANEL: CPT

## 2023-03-02 PROCEDURE — 2580000003 HC RX 258: Performed by: ORTHOPAEDIC SURGERY

## 2023-03-02 PROCEDURE — 94761 N-INVAS EAR/PLS OXIMETRY MLT: CPT

## 2023-03-02 PROCEDURE — 99239 HOSP IP/OBS DSCHRG MGMT >30: CPT | Performed by: EMERGENCY MEDICINE

## 2023-03-02 PROCEDURE — 96376 TX/PRO/DX INJ SAME DRUG ADON: CPT

## 2023-03-02 PROCEDURE — 6360000002 HC RX W HCPCS: Performed by: INTERNAL MEDICINE

## 2023-03-02 RX ORDER — POLYETHYLENE GLYCOL 3350 17 G/17G
17 POWDER, FOR SOLUTION ORAL DAILY
Qty: 527 G | Refills: 0 | Status: SHIPPED | OUTPATIENT
Start: 2023-03-03 | End: 2023-04-02

## 2023-03-02 RX ORDER — OXYCODONE HYDROCHLORIDE 5 MG/1
5 TABLET ORAL EVERY 6 HOURS PRN
Qty: 12 TABLET | Refills: 0 | Status: SHIPPED | OUTPATIENT
Start: 2023-03-02 | End: 2023-03-07

## 2023-03-02 RX ORDER — OXYCODONE HYDROCHLORIDE 5 MG/1
5 TABLET ORAL EVERY 6 HOURS PRN
Qty: 12 TABLET | Refills: 0 | Status: SHIPPED | OUTPATIENT
Start: 2023-03-02 | End: 2023-03-02 | Stop reason: SDUPTHER

## 2023-03-02 RX ADMIN — HYDROMORPHONE HYDROCHLORIDE 1 MG: 1 INJECTION, SOLUTION INTRAMUSCULAR; INTRAVENOUS; SUBCUTANEOUS at 00:38

## 2023-03-02 RX ADMIN — FAMOTIDINE 20 MG: 20 TABLET ORAL at 08:13

## 2023-03-02 RX ADMIN — HYDROMORPHONE HYDROCHLORIDE 1 MG: 1 INJECTION, SOLUTION INTRAMUSCULAR; INTRAVENOUS; SUBCUTANEOUS at 09:31

## 2023-03-02 RX ADMIN — ACETAMINOPHEN 650 MG: 325 TABLET ORAL at 02:22

## 2023-03-02 RX ADMIN — OXYCODONE HYDROCHLORIDE 5 MG: 5 TABLET ORAL at 08:13

## 2023-03-02 RX ADMIN — SENNOSIDES 8.6 MG: 8.6 TABLET, COATED ORAL at 08:13

## 2023-03-02 RX ADMIN — ENOXAPARIN SODIUM 40 MG: 100 INJECTION SUBCUTANEOUS at 08:14

## 2023-03-02 RX ADMIN — OXYCODONE HYDROCHLORIDE 5 MG: 5 TABLET ORAL at 02:23

## 2023-03-02 RX ADMIN — HYDROMORPHONE HYDROCHLORIDE 1 MG: 1 INJECTION, SOLUTION INTRAMUSCULAR; INTRAVENOUS; SUBCUTANEOUS at 04:40

## 2023-03-02 RX ADMIN — SODIUM CHLORIDE, PRESERVATIVE FREE 10 ML: 5 INJECTION INTRAVENOUS at 08:16

## 2023-03-02 RX ADMIN — ACETAMINOPHEN 650 MG: 325 TABLET ORAL at 08:12

## 2023-03-02 RX ADMIN — BISACODYL 5 MG: 5 TABLET, COATED ORAL at 08:13

## 2023-03-02 ASSESSMENT — PAIN DESCRIPTION - DESCRIPTORS
DESCRIPTORS: ACHING
DESCRIPTORS: SORE
DESCRIPTORS: ACHING

## 2023-03-02 ASSESSMENT — PAIN DESCRIPTION - ORIENTATION
ORIENTATION: ANTERIOR
ORIENTATION: ANTERIOR
ORIENTATION: LEFT
ORIENTATION: RIGHT
ORIENTATION: ANTERIOR

## 2023-03-02 ASSESSMENT — PAIN SCALES - GENERAL
PAINLEVEL_OUTOF10: 7
PAINLEVEL_OUTOF10: 7
PAINLEVEL_OUTOF10: 6
PAINLEVEL_OUTOF10: 6
PAINLEVEL_OUTOF10: 7
PAINLEVEL_OUTOF10: 5

## 2023-03-02 ASSESSMENT — PAIN DESCRIPTION - LOCATION
LOCATION: NECK
LOCATION: NECK;SHOULDER
LOCATION: NECK;SACRUM
LOCATION: NECK
LOCATION: NECK

## 2023-03-02 ASSESSMENT — PAIN - FUNCTIONAL ASSESSMENT
PAIN_FUNCTIONAL_ASSESSMENT: ACTIVITIES ARE NOT PREVENTED
PAIN_FUNCTIONAL_ASSESSMENT: ACTIVITIES ARE NOT PREVENTED

## 2023-03-02 NOTE — PROGRESS NOTES
0700: Bedside and Verbal shift change report given to GOLDY Lake (oncoming nurse) by Patti Zurita RN (offgoing nurse). Report included the following information Nurse Handoff Report, Index, Surgery Report, Intake/Output, MAR, Recent Results, and Cardiac Rhythm NSR .     0830: Shift assessment and medications completed. Pt with complaints of pain in left side of neck and right shoulder. Pt medicated for pain with Diluadid 1mg    0930: Pt calling to nurses station to report complaints of pain. Pt was medicated for right neck/shoulder pain with Oxycodone 5 mg.    1115: Pt called to report discharging from unit. Pt was informed no order for discharge was placed. MD paged regarding the matter. MD states, \" I will be on the unit in 10-15 minutes, please have pt wait\". Pt was informed of this message and was compliant. 1200: Dr. Eric Block at beside. 1215: Discharge orders in. Pt informed a walk test needs to be performed prior to discharge. 1230: Walk test performed. See note. 1246: Discharge instructions reviewed, pt allowed time for questions. All questions answered. Pt transported by this RN down to [facility] Main entrance.

## 2023-03-02 NOTE — PROGRESS NOTES
To Whomsoever this may concern    This is to certify that Alvenia General was admitted to [facility] on 2/27/23 and discharged on 3/2/23, and has been advised to take rest at home for 7 ( seven ) more days and then obtain clearance from the spine surgeon prior to resuming work.     Romeo Way MD

## 2023-03-02 NOTE — PROGRESS NOTES
Discharge order noted for today. Orders received. No needs identified at this time. Prescriptions sent to Pharmacy of choice. Patient's father to pay and pick medication up for patient when he gets off work today. Case management remains available as needed.       [facility] RN CDCES  Case Management

## 2023-03-05 LAB
BACTERIA SPEC CULT: NORMAL
BACTERIA SPEC CULT: NORMAL
SERVICE CMNT-IMP: NORMAL
SERVICE CMNT-IMP: NORMAL

## 2023-10-21 ENCOUNTER — HOSPITAL ENCOUNTER (EMERGENCY)
Facility: HOSPITAL | Age: 38
Discharge: HOME | End: 2023-10-21
Attending: EMERGENCY MEDICINE
Payer: COMMERCIAL

## 2023-10-21 VITALS
TEMPERATURE: 98.7 F | DIASTOLIC BLOOD PRESSURE: 74 MMHG | SYSTOLIC BLOOD PRESSURE: 110 MMHG | HEART RATE: 94 BPM | BODY MASS INDEX: 21.31 KG/M2 | OXYGEN SATURATION: 96 % | RESPIRATION RATE: 14 BRPM | HEIGHT: 76 IN | WEIGHT: 175 LBS

## 2023-10-21 DIAGNOSIS — T50.901A ACCIDENTAL OVERDOSE, INITIAL ENCOUNTER: Primary | ICD-10-CM

## 2023-10-21 PROCEDURE — 96374 THER/PROPH/DIAG INJ IV PUSH: CPT

## 2023-10-21 PROCEDURE — 6360000002 HC RX W HCPCS: Performed by: EMERGENCY MEDICINE

## 2023-10-21 PROCEDURE — 99284 EMERGENCY DEPT VISIT MOD MDM: CPT

## 2023-10-21 RX ORDER — LORAZEPAM 2 MG/ML
2 INJECTION INTRAMUSCULAR ONCE
Status: DISCONTINUED | OUTPATIENT
Start: 2023-10-21 | End: 2023-10-21 | Stop reason: HOSPADM

## 2023-10-21 RX ORDER — NALOXONE HYDROCHLORIDE 0.4 MG/ML
0.4 INJECTION, SOLUTION INTRAMUSCULAR; INTRAVENOUS; SUBCUTANEOUS ONCE
Status: COMPLETED | OUTPATIENT
Start: 2023-10-21 | End: 2023-10-21

## 2023-10-21 RX ADMIN — NALOXONE HYDROCHLORIDE 0.4 MG: 0.4 INJECTION, SOLUTION INTRAMUSCULAR; INTRAVENOUS; SUBCUTANEOUS at 19:32

## 2023-10-21 ASSESSMENT — LIFESTYLE VARIABLES
HOW MANY STANDARD DRINKS CONTAINING ALCOHOL DO YOU HAVE ON A TYPICAL DAY: 3 OR 4
HOW OFTEN DO YOU HAVE A DRINK CONTAINING ALCOHOL: 4 OR MORE TIMES A WEEK

## 2023-10-21 ASSESSMENT — PAIN - FUNCTIONAL ASSESSMENT: PAIN_FUNCTIONAL_ASSESSMENT: 0-10

## 2023-10-21 ASSESSMENT — PAIN SCALES - GENERAL: PAINLEVEL_OUTOF10: 0

## 2023-10-21 NOTE — ED TRIAGE NOTES
Bystander called EMS after seeing patient acting weird, possible overdose, pt does admit to heroin and something else, denies ETOH today. Pt initiall had occasional periods of apnea and snoring resp.

## 2023-10-22 NOTE — ED NOTES
Pt AO x4, VS stable, able to ambulate to restroom, would like to leave.      Douglas Jamison RN  10/21/23 2119

## (undated) DEVICE — SPONGE SURG SM 3/8IN WHT PNUT DISECT RADPQ ST

## (undated) DEVICE — APPLIER CLP L9.38IN M LIG TI DISP STR RNG HNDL LIGACLP

## (undated) DEVICE — Device

## (undated) DEVICE — KIT CLN UP BON SECOURS MARYV

## (undated) DEVICE — STERILE SAFETY PINS #2 2/PK: Brand: MEDLINE

## (undated) DEVICE — GLOVE ORTHO 8   MSG9480

## (undated) DEVICE — 3M™ TEGADERM™ TRANSPARENT FILM DRESSING FRAME STYLE, 1626W, 4 IN X 4-3/4 IN (10 CM X 12 CM), 50/CT 4CT/CASE: Brand: 3M™ TEGADERM™

## (undated) DEVICE — PACKING 8004000 NEURAY 200PK 13X13MM: Brand: NEURAY ®

## (undated) DEVICE — BOTTLE  SPRAY  HYDROGEN PEROXIDE  EMPTY

## (undated) DEVICE — SCREW EXT FIX L14MM FOR DISTRCTN

## (undated) DEVICE — DRAIN SURG W7MMXL20CM SIL FULL PERF HUBLESS FLAT RADPQ STRP

## (undated) DEVICE — SUTURE VCRL SZ 3-0 L27IN ABSRB UD L26MM SH 1/2 CIR J416H

## (undated) DEVICE — BLADE ES ELASTOMERIC COAT INSUL DURABLE BEND UPTO 90DEG

## (undated) DEVICE — ELECTRODE PT RET AD L9FT HI MOIST COND ADH HYDRGEL CORDED

## (undated) DEVICE — SYRINGE MED 3ML NDL 22GA L1 1/2IN REG BVL SFGLDE

## (undated) DEVICE — SYSTEM SKIN CLSR 60CM 2-OCTYL CYNOACRLT W/ MESH DISPNS

## (undated) DEVICE — COLLAR CERV L H3.25X23IN M DENS FOAM COT STOCK CVR LO

## (undated) DEVICE — 3.0MM PRECISION NEURO (MATCH HEAD)

## (undated) DEVICE — 10FR FRAZIER SUCTION HANDLE: Brand: CARDINAL HEALTH

## (undated) DEVICE — APPLICATOR MEDICATED 10.5 CC SOLUTION CLR STRL CHLORAPREP

## (undated) DEVICE — SSC BONE WAX: Brand: SSC BONE WAX

## (undated) DEVICE — INTENDED FOR TISSUE SEPARATION, AND OTHER PROCEDURES THAT REQUIRE A SHARP SURGICAL BLADE TO PUNCTURE OR CUT.: Brand: BARD-PARKER SAFETY BLADES SIZE 10, STERILE

## (undated) DEVICE — OPTIFOAM GENTLE SA, POSTOP, 4X8: Brand: MEDLINE